# Patient Record
Sex: MALE | Race: WHITE | NOT HISPANIC OR LATINO | Employment: FULL TIME | URBAN - METROPOLITAN AREA
[De-identification: names, ages, dates, MRNs, and addresses within clinical notes are randomized per-mention and may not be internally consistent; named-entity substitution may affect disease eponyms.]

---

## 2017-02-06 ENCOUNTER — OFFICE VISIT - HEALTHEAST (OUTPATIENT)
Dept: FAMILY MEDICINE | Facility: CLINIC | Age: 52
End: 2017-02-06

## 2017-02-06 DIAGNOSIS — N52.9 MALE ERECTILE DISORDER: ICD-10-CM

## 2017-02-06 DIAGNOSIS — I10 HYPERTENSION: ICD-10-CM

## 2017-02-06 DIAGNOSIS — B00.9 HERPES SIMPLEX: ICD-10-CM

## 2017-02-06 DIAGNOSIS — E78.5 HYPERLIPIDEMIA: ICD-10-CM

## 2017-02-06 DIAGNOSIS — Z00.00 PHYSICAL EXAM: ICD-10-CM

## 2017-02-06 LAB
CHOLEST SERPL-MCNC: 168 MG/DL
FASTING STATUS PATIENT QL REPORTED: YES
HDLC SERPL-MCNC: 55 MG/DL
LDLC SERPL CALC-MCNC: 97 MG/DL
PSA SERPL-MCNC: 0.7 NG/ML (ref 0–3.5)
TRIGL SERPL-MCNC: 82 MG/DL

## 2017-02-06 ASSESSMENT — MIFFLIN-ST. JEOR: SCORE: 1806.38

## 2017-02-07 ENCOUNTER — COMMUNICATION - HEALTHEAST (OUTPATIENT)
Dept: FAMILY MEDICINE | Facility: CLINIC | Age: 52
End: 2017-02-07

## 2017-02-08 ENCOUNTER — COMMUNICATION - HEALTHEAST (OUTPATIENT)
Dept: FAMILY MEDICINE | Facility: CLINIC | Age: 52
End: 2017-02-08

## 2017-02-10 ENCOUNTER — COMMUNICATION - HEALTHEAST (OUTPATIENT)
Dept: FAMILY MEDICINE | Facility: CLINIC | Age: 52
End: 2017-02-10

## 2017-02-10 DIAGNOSIS — N52.8 OTHER MALE ERECTILE DYSFUNCTION: ICD-10-CM

## 2017-02-14 ENCOUNTER — AMBULATORY - HEALTHEAST (OUTPATIENT)
Dept: FAMILY MEDICINE | Facility: CLINIC | Age: 52
End: 2017-02-14

## 2017-02-21 ENCOUNTER — COMMUNICATION - HEALTHEAST (OUTPATIENT)
Dept: ADMINISTRATIVE | Facility: CLINIC | Age: 52
End: 2017-02-21

## 2017-03-21 ENCOUNTER — COMMUNICATION - HEALTHEAST (OUTPATIENT)
Dept: FAMILY MEDICINE | Facility: CLINIC | Age: 52
End: 2017-03-21

## 2017-03-21 DIAGNOSIS — I10 HYPERTENSION: ICD-10-CM

## 2017-03-21 DIAGNOSIS — E78.5 HYPERLIPEMIA: ICD-10-CM

## 2017-03-27 ENCOUNTER — COMMUNICATION - HEALTHEAST (OUTPATIENT)
Dept: FAMILY MEDICINE | Facility: CLINIC | Age: 52
End: 2017-03-27

## 2017-03-27 DIAGNOSIS — B00.9 HERPES SIMPLEX: ICD-10-CM

## 2018-01-30 ENCOUNTER — RECORDS - HEALTHEAST (OUTPATIENT)
Dept: ADMINISTRATIVE | Facility: OTHER | Age: 53
End: 2018-01-30

## 2018-03-30 ENCOUNTER — AMBULATORY - HEALTHEAST (OUTPATIENT)
Dept: INTERNAL MEDICINE | Facility: CLINIC | Age: 53
End: 2018-03-30

## 2018-03-30 ENCOUNTER — COMMUNICATION - HEALTHEAST (OUTPATIENT)
Dept: FAMILY MEDICINE | Facility: CLINIC | Age: 53
End: 2018-03-30

## 2018-03-30 DIAGNOSIS — E78.5 HYPERLIPIDEMIA: ICD-10-CM

## 2018-03-30 DIAGNOSIS — B00.9 HERPES SIMPLEX: ICD-10-CM

## 2018-04-02 ENCOUNTER — COMMUNICATION - HEALTHEAST (OUTPATIENT)
Dept: FAMILY MEDICINE | Facility: CLINIC | Age: 53
End: 2018-04-02

## 2018-04-02 DIAGNOSIS — I10 HYPERTENSION: ICD-10-CM

## 2018-04-02 DIAGNOSIS — E78.5 HYPERLIPEMIA: ICD-10-CM

## 2018-04-09 ENCOUNTER — AMBULATORY - HEALTHEAST (OUTPATIENT)
Dept: LAB | Facility: CLINIC | Age: 53
End: 2018-04-09

## 2018-04-09 DIAGNOSIS — E78.5 HYPERLIPIDEMIA: ICD-10-CM

## 2018-04-09 LAB
ALBUMIN SERPL-MCNC: 4 G/DL (ref 3.5–5)
ALP SERPL-CCNC: 50 U/L (ref 45–120)
ALT SERPL W P-5'-P-CCNC: 53 U/L (ref 0–45)
ANION GAP SERPL CALCULATED.3IONS-SCNC: 10 MMOL/L (ref 5–18)
AST SERPL W P-5'-P-CCNC: 37 U/L (ref 0–40)
BILIRUB SERPL-MCNC: 0.6 MG/DL (ref 0–1)
BUN SERPL-MCNC: 14 MG/DL (ref 8–22)
CALCIUM SERPL-MCNC: 9.7 MG/DL (ref 8.5–10.5)
CHLORIDE BLD-SCNC: 102 MMOL/L (ref 98–107)
CHOLEST SERPL-MCNC: 189 MG/DL
CO2 SERPL-SCNC: 29 MMOL/L (ref 22–31)
CREAT SERPL-MCNC: 0.79 MG/DL (ref 0.7–1.3)
FASTING STATUS PATIENT QL REPORTED: YES
GFR SERPL CREATININE-BSD FRML MDRD: >60 ML/MIN/1.73M2
GLUCOSE BLD-MCNC: 92 MG/DL (ref 70–125)
HDLC SERPL-MCNC: 61 MG/DL
LDLC SERPL CALC-MCNC: 105 MG/DL
POTASSIUM BLD-SCNC: 4.4 MMOL/L (ref 3.5–5)
PROT SERPL-MCNC: 7.3 G/DL (ref 6–8)
SODIUM SERPL-SCNC: 141 MMOL/L (ref 136–145)
TRIGL SERPL-MCNC: 114 MG/DL

## 2018-04-16 ENCOUNTER — COMMUNICATION - HEALTHEAST (OUTPATIENT)
Dept: FAMILY MEDICINE | Facility: CLINIC | Age: 53
End: 2018-04-16

## 2018-04-16 ENCOUNTER — OFFICE VISIT - HEALTHEAST (OUTPATIENT)
Dept: FAMILY MEDICINE | Facility: CLINIC | Age: 53
End: 2018-04-16

## 2018-04-16 DIAGNOSIS — B00.9 HERPES SIMPLEX: ICD-10-CM

## 2018-04-16 DIAGNOSIS — Z00.00 PHYSICAL EXAM: ICD-10-CM

## 2018-04-16 DIAGNOSIS — R13.10 DYSPHAGIA: ICD-10-CM

## 2018-04-16 DIAGNOSIS — I10 HYPERTENSION: ICD-10-CM

## 2018-04-16 DIAGNOSIS — E78.5 HYPERLIPIDEMIA: ICD-10-CM

## 2018-04-16 ASSESSMENT — MIFFLIN-ST. JEOR: SCORE: 1850.04

## 2018-05-04 ENCOUNTER — RECORDS - HEALTHEAST (OUTPATIENT)
Dept: ADMINISTRATIVE | Facility: OTHER | Age: 53
End: 2018-05-04

## 2018-05-07 ENCOUNTER — RECORDS - HEALTHEAST (OUTPATIENT)
Dept: ADMINISTRATIVE | Facility: OTHER | Age: 53
End: 2018-05-07

## 2018-07-14 ENCOUNTER — COMMUNICATION - HEALTHEAST (OUTPATIENT)
Dept: FAMILY MEDICINE | Facility: CLINIC | Age: 53
End: 2018-07-14

## 2018-07-14 DIAGNOSIS — I10 HYPERTENSION: ICD-10-CM

## 2018-07-14 DIAGNOSIS — E78.5 HYPERLIPEMIA: ICD-10-CM

## 2018-09-06 ENCOUNTER — COMMUNICATION - HEALTHEAST (OUTPATIENT)
Dept: FAMILY MEDICINE | Facility: CLINIC | Age: 53
End: 2018-09-06

## 2018-09-06 DIAGNOSIS — B00.9 HERPES SIMPLEX: ICD-10-CM

## 2019-04-03 ENCOUNTER — COMMUNICATION - HEALTHEAST (OUTPATIENT)
Dept: FAMILY MEDICINE | Facility: CLINIC | Age: 54
End: 2019-04-03

## 2019-04-03 DIAGNOSIS — E78.5 HYPERLIPEMIA: ICD-10-CM

## 2019-04-03 DIAGNOSIS — I10 HYPERTENSION: ICD-10-CM

## 2019-04-07 ENCOUNTER — COMMUNICATION - HEALTHEAST (OUTPATIENT)
Dept: FAMILY MEDICINE | Facility: CLINIC | Age: 54
End: 2019-04-07

## 2019-04-07 DIAGNOSIS — B00.9 HERPES SIMPLEX: ICD-10-CM

## 2019-05-07 ENCOUNTER — OFFICE VISIT - HEALTHEAST (OUTPATIENT)
Dept: FAMILY MEDICINE | Facility: CLINIC | Age: 54
End: 2019-05-07

## 2019-05-07 DIAGNOSIS — I10 HYPERTENSION, UNSPECIFIED TYPE: ICD-10-CM

## 2019-05-07 DIAGNOSIS — Z00.00 PHYSICAL EXAM: ICD-10-CM

## 2019-05-07 DIAGNOSIS — K22.2 ESOPHAGEAL STRICTURE: ICD-10-CM

## 2019-05-07 DIAGNOSIS — N52.9 MALE ERECTILE DISORDER: ICD-10-CM

## 2019-05-07 DIAGNOSIS — B00.9 HERPES SIMPLEX: ICD-10-CM

## 2019-05-07 DIAGNOSIS — E78.2 MIXED HYPERLIPIDEMIA: ICD-10-CM

## 2019-05-07 DIAGNOSIS — Z12.11 SCREEN FOR COLON CANCER: ICD-10-CM

## 2019-05-07 LAB
ALBUMIN SERPL-MCNC: 4.4 G/DL (ref 3.5–5)
ALP SERPL-CCNC: 49 U/L (ref 45–120)
ALT SERPL W P-5'-P-CCNC: 34 U/L (ref 0–45)
ANION GAP SERPL CALCULATED.3IONS-SCNC: 8 MMOL/L (ref 5–18)
AST SERPL W P-5'-P-CCNC: 31 U/L (ref 0–40)
BILIRUB SERPL-MCNC: 0.5 MG/DL (ref 0–1)
BUN SERPL-MCNC: 12 MG/DL (ref 8–22)
CALCIUM SERPL-MCNC: 10.2 MG/DL (ref 8.5–10.5)
CHLORIDE BLD-SCNC: 106 MMOL/L (ref 98–107)
CHOLEST SERPL-MCNC: 179 MG/DL
CO2 SERPL-SCNC: 30 MMOL/L (ref 22–31)
CREAT SERPL-MCNC: 0.88 MG/DL (ref 0.7–1.3)
FASTING STATUS PATIENT QL REPORTED: YES
GFR SERPL CREATININE-BSD FRML MDRD: >60 ML/MIN/1.73M2
GLUCOSE BLD-MCNC: 96 MG/DL (ref 70–125)
HDLC SERPL-MCNC: 65 MG/DL
LDLC SERPL CALC-MCNC: 95 MG/DL
POTASSIUM BLD-SCNC: 4.6 MMOL/L (ref 3.5–5)
PROT SERPL-MCNC: 7.3 G/DL (ref 6–8)
SODIUM SERPL-SCNC: 144 MMOL/L (ref 136–145)
TRIGL SERPL-MCNC: 93 MG/DL

## 2019-05-07 ASSESSMENT — MIFFLIN-ST. JEOR: SCORE: 1804.11

## 2019-05-08 ENCOUNTER — COMMUNICATION - HEALTHEAST (OUTPATIENT)
Dept: FAMILY MEDICINE | Facility: CLINIC | Age: 54
End: 2019-05-08

## 2019-05-17 ENCOUNTER — HOSPITAL ENCOUNTER (OUTPATIENT)
Dept: CT IMAGING | Facility: CLINIC | Age: 54
Discharge: HOME | End: 2019-05-17
Attending: FAMILY MEDICINE

## 2019-05-17 DIAGNOSIS — Z00.00 PHYSICAL EXAM: ICD-10-CM

## 2019-05-17 DIAGNOSIS — E78.2 MIXED HYPERLIPIDEMIA: ICD-10-CM

## 2019-05-17 DIAGNOSIS — I10 HYPERTENSION, UNSPECIFIED TYPE: ICD-10-CM

## 2019-05-17 LAB
CV CALCIUM SCORE AGATSTON LM: 0
CV CALCIUM SCORING AGATSON LAD: 0
CV CALCIUM SCORING AGATSTON CX: 0
CV CALCIUM SCORING AGATSTON RCA: 0
CV CALCIUM SCORING AGATSTON TOTAL: 0

## 2019-05-21 ENCOUNTER — AMBULATORY - HEALTHEAST (OUTPATIENT)
Dept: FAMILY MEDICINE | Facility: CLINIC | Age: 54
End: 2019-05-21

## 2019-05-21 ENCOUNTER — COMMUNICATION - HEALTHEAST (OUTPATIENT)
Dept: FAMILY MEDICINE | Facility: CLINIC | Age: 54
End: 2019-05-21

## 2019-05-28 ENCOUNTER — COMMUNICATION - HEALTHEAST (OUTPATIENT)
Dept: FAMILY MEDICINE | Facility: CLINIC | Age: 54
End: 2019-05-28

## 2019-05-28 ENCOUNTER — AMBULATORY - HEALTHEAST (OUTPATIENT)
Dept: FAMILY MEDICINE | Facility: CLINIC | Age: 54
End: 2019-05-28

## 2019-05-28 DIAGNOSIS — B00.9 HERPES SIMPLEX: ICD-10-CM

## 2019-05-28 DIAGNOSIS — I10 HYPERTENSION, UNSPECIFIED TYPE: ICD-10-CM

## 2019-06-30 ENCOUNTER — COMMUNICATION - HEALTHEAST (OUTPATIENT)
Dept: FAMILY MEDICINE | Facility: CLINIC | Age: 54
End: 2019-06-30

## 2019-06-30 DIAGNOSIS — E78.5 HYPERLIPEMIA: ICD-10-CM

## 2019-06-30 DIAGNOSIS — I10 HYPERTENSION: ICD-10-CM

## 2019-08-25 ENCOUNTER — COMMUNICATION - HEALTHEAST (OUTPATIENT)
Dept: FAMILY MEDICINE | Facility: CLINIC | Age: 54
End: 2019-08-25

## 2019-08-25 DIAGNOSIS — I10 HYPERTENSION, UNSPECIFIED TYPE: ICD-10-CM

## 2019-09-24 ENCOUNTER — OFFICE VISIT - HEALTHEAST (OUTPATIENT)
Dept: FAMILY MEDICINE | Facility: CLINIC | Age: 54
End: 2019-09-24

## 2019-09-24 DIAGNOSIS — S99.929A INJURY OF NAIL BED OF TOE: ICD-10-CM

## 2019-09-26 ENCOUNTER — OFFICE VISIT - HEALTHEAST (OUTPATIENT)
Dept: PODIATRY | Facility: CLINIC | Age: 54
End: 2019-09-26

## 2019-09-26 DIAGNOSIS — L60.1 ONYCHOLYSIS: ICD-10-CM

## 2019-09-26 DIAGNOSIS — B35.1 ONYCHOMYCOSIS: ICD-10-CM

## 2019-09-26 ASSESSMENT — MIFFLIN-ST. JEOR: SCORE: 1829.06

## 2019-10-03 ENCOUNTER — OFFICE VISIT - HEALTHEAST (OUTPATIENT)
Dept: PODIATRY | Facility: CLINIC | Age: 54
End: 2019-10-03

## 2019-10-03 DIAGNOSIS — B35.1 ONYCHOMYCOSIS: ICD-10-CM

## 2019-10-03 ASSESSMENT — MIFFLIN-ST. JEOR: SCORE: 1829.06

## 2019-11-19 ENCOUNTER — COMMUNICATION - HEALTHEAST (OUTPATIENT)
Dept: FAMILY MEDICINE | Facility: CLINIC | Age: 54
End: 2019-11-19

## 2019-11-19 DIAGNOSIS — I10 HYPERTENSION, UNSPECIFIED TYPE: ICD-10-CM

## 2019-12-10 ENCOUNTER — OFFICE VISIT - HEALTHEAST (OUTPATIENT)
Dept: PODIATRY | Facility: CLINIC | Age: 54
End: 2019-12-10

## 2019-12-10 DIAGNOSIS — M21.621 TAILOR'S BUNION OF BOTH FEET: ICD-10-CM

## 2019-12-10 DIAGNOSIS — B35.1 ONYCHOMYCOSIS: ICD-10-CM

## 2019-12-10 DIAGNOSIS — M21.622 TAILOR'S BUNION OF BOTH FEET: ICD-10-CM

## 2020-03-27 ENCOUNTER — COMMUNICATION - HEALTHEAST (OUTPATIENT)
Dept: INTERNAL MEDICINE | Facility: CLINIC | Age: 55
End: 2020-03-27

## 2020-03-27 DIAGNOSIS — I10 HYPERTENSION, UNSPECIFIED TYPE: ICD-10-CM

## 2020-06-22 ENCOUNTER — COMMUNICATION - HEALTHEAST (OUTPATIENT)
Dept: FAMILY MEDICINE | Facility: CLINIC | Age: 55
End: 2020-06-22

## 2020-06-22 DIAGNOSIS — K22.2 ESOPHAGEAL STRICTURE: ICD-10-CM

## 2020-06-23 ENCOUNTER — COMMUNICATION - HEALTHEAST (OUTPATIENT)
Dept: INTERNAL MEDICINE | Facility: CLINIC | Age: 55
End: 2020-06-23

## 2020-06-23 DIAGNOSIS — I10 HYPERTENSION, UNSPECIFIED TYPE: ICD-10-CM

## 2020-07-14 ENCOUNTER — COMMUNICATION - HEALTHEAST (OUTPATIENT)
Dept: PODIATRY | Facility: CLINIC | Age: 55
End: 2020-07-14

## 2020-07-20 ENCOUNTER — COMMUNICATION - HEALTHEAST (OUTPATIENT)
Dept: FAMILY MEDICINE | Facility: CLINIC | Age: 55
End: 2020-07-20

## 2020-07-21 ENCOUNTER — COMMUNICATION - HEALTHEAST (OUTPATIENT)
Dept: INTERNAL MEDICINE | Facility: CLINIC | Age: 55
End: 2020-07-21

## 2020-07-21 DIAGNOSIS — I10 HYPERTENSION, UNSPECIFIED TYPE: ICD-10-CM

## 2020-08-03 ENCOUNTER — COMMUNICATION - HEALTHEAST (OUTPATIENT)
Dept: INTERNAL MEDICINE | Facility: CLINIC | Age: 55
End: 2020-08-03

## 2020-08-03 DIAGNOSIS — I10 HYPERTENSION, UNSPECIFIED TYPE: ICD-10-CM

## 2020-08-24 ENCOUNTER — OFFICE VISIT - HEALTHEAST (OUTPATIENT)
Dept: FAMILY MEDICINE | Facility: CLINIC | Age: 55
End: 2020-08-24

## 2020-08-24 DIAGNOSIS — K22.2 ESOPHAGEAL STRICTURE: ICD-10-CM

## 2020-08-24 DIAGNOSIS — I10 HYPERTENSION, UNSPECIFIED TYPE: ICD-10-CM

## 2020-08-24 DIAGNOSIS — L84 CALLUS OF FOOT: ICD-10-CM

## 2020-08-24 DIAGNOSIS — B00.9 HERPES SIMPLEX: ICD-10-CM

## 2020-08-24 DIAGNOSIS — N52.9 MALE ERECTILE DISORDER: ICD-10-CM

## 2020-08-24 DIAGNOSIS — E78.2 MIXED HYPERLIPIDEMIA: ICD-10-CM

## 2020-08-24 DIAGNOSIS — Z00.00 PHYSICAL EXAM: ICD-10-CM

## 2020-08-24 LAB
ALBUMIN SERPL-MCNC: 4.4 G/DL (ref 3.5–5)
ALP SERPL-CCNC: 53 U/L (ref 45–120)
ALT SERPL W P-5'-P-CCNC: 23 U/L (ref 0–45)
ANION GAP SERPL CALCULATED.3IONS-SCNC: 8 MMOL/L (ref 5–18)
AST SERPL W P-5'-P-CCNC: 26 U/L (ref 0–40)
BILIRUB SERPL-MCNC: 0.5 MG/DL (ref 0–1)
BUN SERPL-MCNC: 9 MG/DL (ref 8–22)
CALCIUM SERPL-MCNC: 10.1 MG/DL (ref 8.5–10.5)
CHLORIDE BLD-SCNC: 104 MMOL/L (ref 98–107)
CHOLEST SERPL-MCNC: 215 MG/DL
CO2 SERPL-SCNC: 30 MMOL/L (ref 22–31)
CREAT SERPL-MCNC: 0.88 MG/DL (ref 0.7–1.3)
FASTING STATUS PATIENT QL REPORTED: NO
GFR SERPL CREATININE-BSD FRML MDRD: >60 ML/MIN/1.73M2
GLUCOSE BLD-MCNC: 87 MG/DL (ref 70–125)
HDLC SERPL-MCNC: 58 MG/DL
LDLC SERPL CALC-MCNC: 127 MG/DL
POTASSIUM BLD-SCNC: 5 MMOL/L (ref 3.5–5)
PROT SERPL-MCNC: 7.5 G/DL (ref 6–8)
SODIUM SERPL-SCNC: 142 MMOL/L (ref 136–145)
TRIGL SERPL-MCNC: 152 MG/DL

## 2020-08-24 RX ORDER — LISINOPRIL 10 MG/1
10 TABLET ORAL DAILY
Qty: 90 TABLET | Refills: 3 | Status: SHIPPED | OUTPATIENT
Start: 2020-08-24 | End: 2021-08-23

## 2020-08-24 RX ORDER — PANTOPRAZOLE SODIUM 40 MG/1
40 TABLET, DELAYED RELEASE ORAL DAILY
Qty: 90 TABLET | Refills: 3 | Status: SHIPPED | OUTPATIENT
Start: 2020-08-24 | End: 2021-09-28

## 2020-08-24 RX ORDER — SILDENAFIL CITRATE 20 MG/1
20 TABLET ORAL 3 TIMES DAILY
Qty: 90 TABLET | Refills: 1 | Status: SHIPPED | OUTPATIENT
Start: 2020-08-24 | End: 2024-09-09

## 2020-08-24 RX ORDER — VALACYCLOVIR HYDROCHLORIDE 500 MG/1
TABLET, FILM COATED ORAL
Qty: 30 TABLET | Refills: 1 | Status: SHIPPED | OUTPATIENT
Start: 2020-08-24 | End: 2021-09-28

## 2020-08-24 ASSESSMENT — MIFFLIN-ST. JEOR: SCORE: 1790.17

## 2020-08-25 ENCOUNTER — COMMUNICATION - HEALTHEAST (OUTPATIENT)
Dept: FAMILY MEDICINE | Facility: CLINIC | Age: 55
End: 2020-08-25

## 2020-09-07 ENCOUNTER — COMMUNICATION - HEALTHEAST (OUTPATIENT)
Dept: FAMILY MEDICINE | Facility: CLINIC | Age: 55
End: 2020-09-07

## 2020-09-07 DIAGNOSIS — B00.9 HERPES SIMPLEX: ICD-10-CM

## 2021-02-04 ENCOUNTER — RECORDS - HEALTHEAST (OUTPATIENT)
Dept: ADMINISTRATIVE | Facility: OTHER | Age: 56
End: 2021-02-04

## 2021-05-26 VITALS — HEART RATE: 70 BPM | TEMPERATURE: 97.5 F | DIASTOLIC BLOOD PRESSURE: 78 MMHG | SYSTOLIC BLOOD PRESSURE: 152 MMHG

## 2021-05-27 NOTE — TELEPHONE ENCOUNTER
Due to be seen and labs    Rx renewed per Medication Renewal Policy. Medication was last renewed on 9/6/18.    Cait Tapia, [facility] Triage/Med Refill 4/8/2019     Requested Prescriptions   Pending Prescriptions Disp Refills     valACYclovir (VALTREX) 500 MG tablet [Pharmacy Med Name: VALACYCLOVIR  MG TABLET] 30 tablet 1     Sig: TAKE ONE TWICE DAILY AS NEEDED FOR OUTBREAKS       Antivirals Refill Protocol Passed - 4/7/2019  9:55 AM        Passed - Renal function done in last year     Creatinine   Date Value Ref Range Status   04/09/2018 0.79 0.70 - 1.30 mg/dL Final             Passed - Visit with PCP or prescribing provider visit in past 12 months or next 3 months     Last office visit with prescriber/PCP: 2/6/2017 Davy Houston MD OR same dept: Visit date not found OR same specialty: 2/6/2017 Davy Houston MD  Last physical: 4/16/2018 Last MTM visit: Visit date not found   Next visit within 3 mo: Visit date not found  Next physical within 3 mo: Visit date not found  Prescriber OR PCP: Davy Houston MD  Last diagnosis associated with med order: 1. Herpes simplex  - valACYclovir (VALTREX) 500 MG tablet [Pharmacy Med Name: VALACYCLOVIR  MG TABLET]; TAKE ONE TWICE DAILY AS NEEDED FOR OUTBREAKS  Dispense: 30 tablet; Refill: 1    If protocol passes may refill for 12 months if within 3 months of last provider visit (or a total of 15 months).

## 2021-05-27 NOTE — TELEPHONE ENCOUNTER
Due to be seen and labs    Rx renewed per Medication Renewal Policy. Medication was last renewed on 7/15/18.    Cait Tapia, [facility] Triage/Med Refill 4/4/2019     Requested Prescriptions   Pending Prescriptions Disp Refills     simvastatin (ZOCOR) 10 MG tablet [Pharmacy Med Name: SIMVASTATIN 10 MG TABLET] 90 tablet 2     Sig: TAKE 1 TABLET BY MOUTH EVERY DAY    Statins Refill Protocol (Hmg CoA Reductase Inhibitors) Passed - 4/3/2019  9:04 AM       Passed - PCP or prescribing provider visit in past 12 months     Last office visit with prescriber/PCP: 2/6/2017 Davy Houston MD OR same dept: Visit date not found OR same specialty: 2/6/2017 Davy Houston MD  Last physical: 4/16/2018 Last MTM visit: Visit date not found   Next visit within 3 mo: Visit date not found  Next physical within 3 mo: Visit date not found  Prescriber OR PCP: Davy Houston MD  Last diagnosis associated with med order: 1. Hyperlipemia  - simvastatin (ZOCOR) 10 MG tablet [Pharmacy Med Name: SIMVASTATIN 10 MG TABLET]; TAKE 1 TABLET BY MOUTH EVERY DAY  Dispense: 90 tablet; Refill: 2    2. Hypertension  - lisinopril (PRINIVIL,ZESTRIL) 5 MG tablet [Pharmacy Med Name: LISINOPRIL 5 MG TABLET]; TAKE 1 TABLET BY MOUTH EVERY DAY  Dispense: 90 tablet; Refill: 2    If protocol passes may refill for 12 months if within 3 months of last provider visit (or a total of 15 months).             lisinopril (PRINIVIL,ZESTRIL) 5 MG tablet [Pharmacy Med Name: LISINOPRIL 5 MG TABLET] 90 tablet 2     Sig: TAKE 1 TABLET BY MOUTH EVERY DAY    Ace Inhibitors Refill Protocol Passed - 4/3/2019  9:04 AM       Passed - PCP or prescribing provider visit in past 12 months      Last office visit with prescriber/PCP: 2/6/2017 Davy Houston MD OR mikayla dept: Visit date not found OR same specialty: 2/6/2017 Davy Houston MD  Last physical: 4/16/2018 Last MTM visit: Visit date not found   Next visit within 3 mo: Visit date not found  Next physical within 3  mo: Visit date not found  Prescriber OR PCP: Davy Houston MD  Last diagnosis associated with med order: 1. Hyperlipemia  - simvastatin (ZOCOR) 10 MG tablet [Pharmacy Med Name: SIMVASTATIN 10 MG TABLET]; TAKE 1 TABLET BY MOUTH EVERY DAY  Dispense: 90 tablet; Refill: 2    2. Hypertension  - lisinopril (PRINIVIL,ZESTRIL) 5 MG tablet [Pharmacy Med Name: LISINOPRIL 5 MG TABLET]; TAKE 1 TABLET BY MOUTH EVERY DAY  Dispense: 90 tablet; Refill: 2    If protocol passes may refill for 12 months if within 3 months of last provider visit (or a total of 15 months).            Passed - Serum Potassium in past 12 months    Lab Results   Component Value Date    Potassium 4.4 04/09/2018            Passed - Blood pressure filed in past 12 months    BP Readings from Last 1 Encounters:   04/16/18 104/74            Passed - Serum Creatinine in past 12 months    Creatinine   Date Value Ref Range Status   04/09/2018 0.79 0.70 - 1.30 mg/dL Final

## 2021-05-28 NOTE — PATIENT INSTRUCTIONS - HE
We will comment on laboratory studies and also contact you with the results of the coronary calcium score.  Check blood pressure on occasion, goal is less than 140/90 if running higher than not let me know we may need to his blood pressure medication.

## 2021-05-30 VITALS — HEIGHT: 72 IN | WEIGHT: 206 LBS | BODY MASS INDEX: 27.9 KG/M2

## 2021-05-30 NOTE — TELEPHONE ENCOUNTER
Jhoan Renteria   5/21/2019   Orders Only   MRN:  [id number]   Description: 53 year old male Provider: Davy Houston MD Department: [facility]   Progress Notes     No notes of this type exist for this encounter.   Discontinued Medications      Reason for Discontinue   aspirin 81 MG EC tablet Therapy completed   simvastatin (ZOCOR) 10 MG tablet Therapy completed     Discontinued Medications      Reason for Discontinue   lisinopril (PRINIVIL,ZESTRIL) 5 MG tablet Dose adjustment     Cait Tapia RN [facility] Triage/Medication Refill

## 2021-05-31 NOTE — TELEPHONE ENCOUNTER
Refill Approved    Rx renewed per Medication Renewal Policy. Medication was last renewed on 5/28/19.  OV 5/7/19.  Lis Mahoney, [facility] Triage/Med Refill 8/25/2019     Requested Prescriptions   Pending Prescriptions Disp Refills     lisinopril (PRINIVIL,ZESTRIL) 10 MG tablet [Pharmacy Med Name: LISINOPRIL 10 MG TABLET] 90 tablet 0     Sig: TAKE 1 TABLET BY MOUTH EVERY DAY       Ace Inhibitors Refill Protocol Passed - 8/25/2019  8:31 AM        Passed - PCP or prescribing provider visit in past 12 months       Last office visit with prescriber/PCP: 2/6/2017 Davy Houston MD OR same dept: Visit date not found OR same specialty: 2/6/2017 Davy Houston MD  Last physical: 5/7/2019 Last MTM visit: Visit date not found   Next visit within 3 mo: Visit date not found  Next physical within 3 mo: Visit date not found  Prescriber OR PCP: Davy Houston MD  Last diagnosis associated with med order: 1. Hypertension, unspecified type  - lisinopril (PRINIVIL,ZESTRIL) 10 MG tablet [Pharmacy Med Name: LISINOPRIL 10 MG TABLET]; TAKE 1 TABLET BY MOUTH EVERY DAY  Dispense: 90 tablet; Refill: 0    If protocol passes may refill for 12 months if within 3 months of last provider visit (or a total of 15 months).             Passed - Serum Potassium in past 12 months     Lab Results   Component Value Date    Potassium 4.6 05/07/2019             Passed - Blood pressure filed in past 12 months     BP Readings from Last 1 Encounters:   05/07/19 167/87             Passed - Serum Creatinine in past 12 months     Creatinine   Date Value Ref Range Status   05/07/2019 0.88 0.70 - 1.30 mg/dL Final

## 2021-06-01 VITALS — BODY MASS INDEX: 28.23 KG/M2 | HEIGHT: 73 IN | WEIGHT: 213 LBS

## 2021-06-01 NOTE — PATIENT INSTRUCTIONS - HE
Lamisil cream: apply 2x per day. Begin use in 2-3 weeks.    Penlac: Begin applying after nail is seen.     Post Nail Excision Instructions      Keep bandages clean, dry, and intact for the rest of the day of surgery.     The day after surgery, remove all bandages    Soak foot in warm water with Epsom Salt for 10 minutes    Dry feet thoroughly     Apply a Band-Aid to the affected area    Continue this process daily for the next two weeks following the procedure    General bathing does not replace daily foot soaks    Do not anticipate severe pain. But if you do experience some discomfort, you can take Ibuprofen (Advil)    You can expect some drainage and weeping from the area. This may last anywhere from several days to several weeks. This is NORMAL.    If you experience any increase in pain, any swelling, or odor call our office immediately. As this may be a sign of infection.    If you have any questions in the meantime, please do not hesitate to call Podiatry at [phone]

## 2021-06-01 NOTE — PROGRESS NOTES
FOOT AND ANKLE SURGERY/PODIATRY CONSULT NOTE         ASSESSMENT:   Onycholysis left hallux  Onychomycosis      TREATMENT:  Onycholysis: There is apparent subungal fluid collection along the left hallux which seems to have migrated along the cuticle. Based on this presentation I recommend a nail avulsion today to relieve fluid build-up and inspect the nail bed. Patient consents. Injected 6 cc 1% lidocaine plain left hallux. Total nail plate removed today, serosanguinous drainage. No purulence. Nail bed is intact. Bacitracin with compressive dressing applied. Post-op instructions dispensed.     Onychomycosis: Discussed topical and oral anti-fungal medication today. Rx Penlac. He will begin with topical lamisil cream two times a day in 2-3 weeks.    He will follow-up with me in one week.     Colton Alvarado, BRITANY  [facility] Foot & Ankle Surgery/Podiatry      HPI: I was asked to see Jhoan Renteria today Antonio Peters for an injury to the left hallux. 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 Denies direct trauma to the digit. He is experiencing pain with walking and direct pressure. No active drainage. He does walk at work, but not excessively. PMH is non-contributory. He also would like to discuss treatment options for fungal nails.    No past medical history on file.    Past Surgical History:   Procedure Laterality Date     FOOT SURGERY Right     Bone spur removal.     SKIN LESION EXCISION      not cancer       No Known Allergies      Current Outpatient Medications:      lisinopril (PRINIVIL,ZESTRIL) 10 MG tablet, Take 1 tablet (10 mg total) by mouth daily., Disp: 90 tablet, Rfl: 0     pantoprazole (PROTONIX) 40 MG tablet, Take 1 tablet (40 mg total) by mouth daily., Disp: 90 tablet, Rfl: 3     sildenafil, antihypertensive, (REVATIO) 20 mg tablet, Take 1 tablet (20 mg total) by mouth 3 (three) times a day., Disp: 90 tablet, Rfl:  1     valACYclovir (VALTREX) 500 MG tablet, TAKE ONE TWICE DAILY AS NEEDED FOR OUTBREAKS, Disp: 30 tablet, Rfl: 1    Family History   Problem Relation Age of Onset     Hypertension Mother      Ovarian cancer Mother      Atrial fibrillation Mother         Ablation     Hyperlipidemia Brother      Hypertension Brother      Heart disease Paternal Uncle      Stroke Paternal Uncle      Prostate cancer Paternal Uncle      Cancer Maternal Grandmother      Heart disease Maternal Grandfather      Dementia Paternal Grandmother      Cancer Paternal Grandmother      Parkinsonism Paternal Grandmother      Stroke Paternal Grandfather 52     Heart attack Paternal Grandfather 58     Arthritis Brother      Hypertension Brother      Hyperlipidemia Brother      Hypertension Brother      Hyperlipidemia Brother        Social History     Socioeconomic History     Marital status:      Spouse name: Not on file     Number of children: 4     Years of education: Not on file     Highest education level: Not on file   Occupational History     Occupation: Computer Programming     Comment: [employer]   Social Needs     Financial resource strain: Not on file     Food insecurity:     Worry: Not on file     Inability: Not on file     Transportation needs:     Medical: Not on file     Non-medical: Not on file   Tobacco Use     Smoking status: Never Smoker     Smokeless tobacco: Never Used   Substance and Sexual Activity     Alcohol use: Yes     Alcohol/week: 3.0 standard drinks     Types: 3 Cans of beer per week     Drug use: No     Sexual activity: Yes     Partners: Female   Lifestyle     Physical activity:     Days per week: Not on file     Minutes per session: Not on file     Stress: Not on file   Relationships     Social connections:     Talks on phone: Not on file     Gets together: Not on file     Attends [employer] service: Not on file     Active member of club or organization: Not on file     Attends meetings of clubs or  organizations: Not on file     Relationship status: Not on file     Intimate partner violence:     Fear of current or ex partner: Not on file     Emotionally abused: Not on file     Physically abused: Not on file     Forced sexual activity: Not on file   Other Topics Concern     Not on file   Social History Narrative    Diet- Regular,         Exercise- Walking, weightlifting       Review of Systems - Patient denies fever, chills, rash, wound, stiffness, limping, numbness, weakness, heart burn, blood in stool, chest pain with activity, calf pain when walking, shortness of breath with activity, chronic cough, easy bleeding/bruising, swelling of ankles, excessive thirst, fatigue, depression, anxiety.        OBJECTIVE:  Appearance: alert, well appearing, and in no distress.    Vitals:    09/26/19 1345   BP: 122/88   Resp: 16   Temp: 98.2  F (36.8  C)       BMI= Body mass index is 28.42 kg/m .    General appearance: Patient is alert and fully cooperative with history & exam.  No sign of distress is noted during the visit.     Psychiatric: Affect is pleasant & appropriate.  Patient appears motivated to improve health.     Respiratory: Breathing is regular & unlabored while sitting.     HEENT: Hearing is intact to spoken word.  Speech is clear.  No gross evidence of visual impairment that would impact ambulation.      Vascular: Dorsalis pedis and posterior tibial pulses are palpable. There is pedal hair growth bilateral.  CFT < 3 sec from anterior tibial surface to distal digits bilateral. There is no appreciable edema noted.  Dermatologic: Turgor and texture are within normal limits. Left hallux nail plate is free from nail bed proximally with discoloration consistent with subungal fluid. No erythema. Dystrophic nails bilateral feet.   Neurologic: All epicritic and proprioceptive sensations are grossly intact bilateral.  Musculoskeletal: Pain along dorsal left hallux nail plate. No pain along proximal left hallux or 1st  CARMEN.     Imaging:     No results found.

## 2021-06-02 NOTE — PROGRESS NOTES
Podiatry Progress Note        ASSESSMENT: S/P Nail avulsion       TREATMENT:  -Surgical site on the left hallux is progressing well. I recommend he ween off band aids in 3-4 days. Then begin topical antifungal cream two times a day. Penlac to be applied after nail plate is present.   -The patient is discharged at this time, but encouraged to return as symptoms dictate.       Colton Alvarado DPM  [facility] Foot & Ankle Surgery/Podiatry      HPI: Jhoan Renteria was seen again today s/p nail avulsion on the left hallux. Doing well. Mild pain, otherwise no concerns.     No past medical history on file.    No Known Allergies      Current Outpatient Medications:      ciclopirox (PENLAC) 8 % solution, Apply topically daily. Apply daily to toenals, Disp: 1 Bottle, Rfl: 6     lisinopril (PRINIVIL,ZESTRIL) 10 MG tablet, Take 1 tablet (10 mg total) by mouth daily., Disp: 90 tablet, Rfl: 0     pantoprazole (PROTONIX) 40 MG tablet, Take 1 tablet (40 mg total) by mouth daily., Disp: 90 tablet, Rfl: 3     sildenafil, antihypertensive, (REVATIO) 20 mg tablet, Take 1 tablet (20 mg total) by mouth 3 (three) times a day., Disp: 90 tablet, Rfl: 1     valACYclovir (VALTREX) 500 MG tablet, TAKE ONE TWICE DAILY AS NEEDED FOR OUTBREAKS, Disp: 30 tablet, Rfl: 1    Review of Systems - Negative       OBJECTIVE:  Appearance: alert, well appearing, and in no distress.    Vitals:    10/03/19 1336   BP: 134/78   Temp: 98.7  F (37.1  C)       Left nail bed is intact, granular base, no fluctuance. No erythema. Neurovascular status intact left foot.    Imaging: None

## 2021-06-03 VITALS
HEART RATE: 74 BPM | OXYGEN SATURATION: 97 % | DIASTOLIC BLOOD PRESSURE: 81 MMHG | BODY MASS INDEX: 28.42 KG/M2 | WEIGHT: 211 LBS | TEMPERATURE: 98.4 F | SYSTOLIC BLOOD PRESSURE: 133 MMHG | RESPIRATION RATE: 15 BRPM

## 2021-06-03 VITALS
SYSTOLIC BLOOD PRESSURE: 134 MMHG | TEMPERATURE: 98.7 F | HEIGHT: 72 IN | BODY MASS INDEX: 28.58 KG/M2 | DIASTOLIC BLOOD PRESSURE: 78 MMHG | WEIGHT: 211 LBS

## 2021-06-03 VITALS
WEIGHT: 211 LBS | SYSTOLIC BLOOD PRESSURE: 122 MMHG | BODY MASS INDEX: 28.58 KG/M2 | HEIGHT: 72 IN | TEMPERATURE: 98.2 F | DIASTOLIC BLOOD PRESSURE: 88 MMHG | RESPIRATION RATE: 16 BRPM

## 2021-06-03 VITALS — BODY MASS INDEX: 27.83 KG/M2 | HEIGHT: 72 IN | WEIGHT: 205.5 LBS

## 2021-06-03 NOTE — TELEPHONE ENCOUNTER
Refill Approved    Rx renewed per Medication Renewal Policy. Medication was last renewed on 8/25/19.    Lis Mahoney, [facility] Triage/Med Refill 11/19/2019     Requested Prescriptions   Pending Prescriptions Disp Refills     lisinopril (PRINIVIL,ZESTRIL) 10 MG tablet [Pharmacy Med Name: LISINOPRIL 10 MG TABLET] 90 tablet 0     Sig: TAKE 1 TABLET BY MOUTH EVERY DAY       Ace Inhibitors Refill Protocol Passed - 11/19/2019  1:54 AM        Passed - PCP or prescribing provider visit in past 12 months       Last office visit with prescriber/PCP: 2/6/2017 Davy Houston MD OR same dept: Visit date not found OR same specialty: 2/6/2017 Davy Houston MD  Last physical: 5/7/2019 Last MTM visit: Visit date not found   Next visit within 3 mo: Visit date not found  Next physical within 3 mo: Visit date not found  Prescriber OR PCP: Davy Houston MD  Last diagnosis associated with med order: 1. Hypertension, unspecified type  - lisinopril (PRINIVIL,ZESTRIL) 10 MG tablet [Pharmacy Med Name: LISINOPRIL 10 MG TABLET]; TAKE 1 TABLET BY MOUTH EVERY DAY  Dispense: 90 tablet; Refill: 0    If protocol passes may refill for 12 months if within 3 months of last provider visit (or a total of 15 months).             Passed - Serum Potassium in past 12 months     Lab Results   Component Value Date    Potassium 4.6 05/07/2019             Passed - Blood pressure filed in past 12 months     BP Readings from Last 1 Encounters:   10/03/19 134/78             Passed - Serum Creatinine in past 12 months     Creatinine   Date Value Ref Range Status   05/07/2019 0.88 0.70 - 1.30 mg/dL Final

## 2021-06-04 VITALS
DIASTOLIC BLOOD PRESSURE: 78 MMHG | BODY MASS INDEX: 26.48 KG/M2 | WEIGHT: 199.8 LBS | HEIGHT: 73 IN | SYSTOLIC BLOOD PRESSURE: 118 MMHG | HEART RATE: 76 BPM

## 2021-06-04 NOTE — PROGRESS NOTES
FOOT AND ANKLE SURGERY/PODIATRY Progress Note        ASSESSMENT:   Onychomycosis  Keratoma  Tailor's bunion       TREATMENT:  -There is evidence of proximal nail growth on the left hallux. I recommend he d/c lamisil cream and begin using Penlac daily.     -Trimmed callus tissue plantar 5th MPJ bilateral. Referred to local routine foot care providers for future callus trimming.    -He will monitor for concerns and follow-up with me as needed.     Colton Alvarado DPM  [facility] Podiatry/Foot & Ankle Surgery      HPI: Jhoan Renteria was seen again today requesting callus trimming and to review application of Penlac. He has been applying lamisil cream as directed and now notices nail growth.     History reviewed. No pertinent past medical history.    Past Surgical History:   Procedure Laterality Date     FOOT SURGERY Right     Bone spur removal.     SKIN LESION EXCISION      not cancer       No Known Allergies      Current Outpatient Medications:      ciclopirox (PENLAC) 8 % solution, Apply topically daily. Apply daily to toenals, Disp: 1 Bottle, Rfl: 6     lisinopril (PRINIVIL,ZESTRIL) 10 MG tablet, Take 1 tablet (10 mg total) by mouth daily., Disp: 90 tablet, Rfl: 0     pantoprazole (PROTONIX) 40 MG tablet, Take 1 tablet (40 mg total) by mouth daily., Disp: 90 tablet, Rfl: 3     sildenafil, antihypertensive, (REVATIO) 20 mg tablet, Take 1 tablet (20 mg total) by mouth 3 (three) times a day., Disp: 90 tablet, Rfl: 1     valACYclovir (VALTREX) 500 MG tablet, TAKE ONE TWICE DAILY AS NEEDED FOR OUTBREAKS, Disp: 30 tablet, Rfl: 1    Family History   Problem Relation Age of Onset     Hypertension Mother      Ovarian cancer Mother      Atrial fibrillation Mother         Ablation     Hyperlipidemia Brother      Hypertension Brother      Heart disease Paternal Uncle      Stroke Paternal Uncle      Prostate cancer Paternal Uncle      Cancer Maternal Grandmother      Heart disease Maternal Grandfather      Dementia  Paternal Grandmother      Cancer Paternal Grandmother      Parkinsonism Paternal Grandmother      Stroke Paternal Grandfather 52     Heart attack Paternal Grandfather 58     Arthritis Brother      Hypertension Brother      Hyperlipidemia Brother      Hypertension Brother      Hyperlipidemia Brother        Social History     Socioeconomic History     Marital status:      Spouse name: Not on file     Number of children: 4     Years of education: Not on file     Highest education level: Not on file   Occupational History     Occupation: Computer Programming     Comment: [employer]   Social Needs     Financial resource strain: Not on file     Food insecurity:     Worry: Not on file     Inability: Not on file     Transportation needs:     Medical: Not on file     Non-medical: Not on file   Tobacco Use     Smoking status: Never Smoker     Smokeless tobacco: Never Used   Substance and Sexual Activity     Alcohol use: Yes     Alcohol/week: 3.0 standard drinks     Types: 3 Cans of beer per week     Drug use: No     Sexual activity: Yes     Partners: Female   Lifestyle     Physical activity:     Days per week: Not on file     Minutes per session: Not on file     Stress: Not on file   Relationships     Social connections:     Talks on phone: Not on file     Gets together: Not on file     Attends Tenriism service: Not on file     Active member of club or organization: Not on file     Attends meetings of clubs or organizations: Not on file     Relationship status: Not on file     Intimate partner violence:     Fear of current or ex partner: Not on file     Emotionally abused: Not on file     Physically abused: Not on file     Forced sexual activity: Not on file   Other Topics Concern     Not on file   Social History Narrative    Diet- Regular,         Exercise- Walking, weightlifting       10 point Review of Systems is negative except for left hallux fungal nail which is noted in HPI.       Vitals:    12/10/19  1106   BP: (!) 148/94   Pulse: 70   Temp: 97.5  F (36.4  C)       BMI= There is no height or weight on file to calculate BMI.    OBJECTIVE:  General appearance: Patient is alert and fully cooperative with history & exam.  No sign of distress is noted during the visit.  Vascular: Dorsalis pedis and posterior tibial pulses are palpable. There is pedal hair growth left.  CFT < 3 sec from anterior tibial surface to distal digits left. There is no appreciable edema noted.  Dermatologic: Turgor and texture are within normal limits. Proximal nail plate growth noted left hallux. No erythema. Hyperkeratotic tissue plantar 5th MPJ bilateral.   Neurologic: All epicritic and proprioceptive sensations are grossly intact left.  Musculoskeletal: Mild tailor's bunion bilateral.     Imaging:     No results found.

## 2021-06-04 NOTE — PATIENT INSTRUCTIONS - HE
Podiatry Clinics that offer foot and toenail care  [facility]  [facility]  [phone]    Foot and Ankle Clinics, [facility]  [phone]    [facility]  [place] - Dr. Orellana on Tuesdays  [phone]    [facility]  [employer]  [phone]    [facility]  [facility] [place]  [phone]    [facility]  [phone]    [facility]  [phone]    [facility]  They have several locations and have a team of registered nurses that offer diabetic foot care.  They do not bill to insurance and the average cost per visit is $37.  [facility]  [phone]    [facility]  *Nurse comes to your home for nail care.  Betty Mike RN Foot Specialist  [phone]

## 2021-06-05 ENCOUNTER — HEALTH MAINTENANCE LETTER (OUTPATIENT)
Age: 56
End: 2021-06-05

## 2021-06-08 NOTE — PROGRESS NOTES
ASSESSMENT:  1. Physical exam  Patient overall has good health habits  - PSA (Prostatic-Specific Antigen), Annual Screen    2. Hyperlipidemia  Simvastatin, I don't have cream and values.  - Comprehensive Metabolic Panel  - Lipid Cascade    3. Hypertension  Well-controlled on lisinopril    4. Herpes simplex  Occasional cold sores spell checks as needed    5. Male erectile disorder  Has been on Viagra, this is been less effective for unclear reasons it does not appear that the patient has symptoms clinically consistent with low testosterone      PLAN:  1.  Laboratory studies as above including PSA because of a history of ovarian cancer in his mother and prostate cancer in a paternal uncle  2.  Laboratory studies as above  3.  Trial of Cialis 20 mg  4.  Patient otherwise should be seen in one year    Orders Placed This Encounter   Procedures     Comprehensive Metabolic Panel     Lipid Cascade     Order Specific Question:   Fasting is required?     Answer:   Yes     PSA (Prostatic-Specific Antigen), Annual Screen     There are no discontinued medications.    No Follow-up on file.    CHIEF COMPLAINT:  Chief Complaint   Patient presents with     Annual Exam     pt is fasting, EST CARE NEEDS: flu vacc        SUBJECTIVE:  Jhoan is a 51 y.o. male who presents to the clinic today for an annual exam.    Health Maintenance: He received the flu shot earlier this season. He is up-to-date for his colonoscopy until 10/13/2019. He is interested in information regarding the Shingles vaccine. He is interested in prostate cancer screening.    REVIEW OF SYSTEMS:   He is currently managing his blood pressure with lisinopril. He has tolerated lisinopril well and has taken other blood pressure medications in the past, hydrochlorothiazide, about three years ago. He had tolerated hydrochlorothiazide as well. He has had elevated blood pressure for about the past twenty years. He has long-standing high cholesterol as well, which he is  currently managing with simvastatin, which he has tolerated well. He is taking low-dose aspirin. He takes supplemental Vitamin D. He uses Valtrex as needed for cold sores and oral sores. He has not had a flare up of cold sores for the past two years, which he feels is stress-related. He continues to use 100 mg of Viagra as needed. He has found that the Viagra is not as effective as it has been. He has noticed that it has become more difficult to swallow solid foods. He had asthma when he was younger, for which he did have medication. He does not use an inhaler, but has noticed some difficulty breathing with running. He has noticed some increased urinary urgency in the morning and occasional waking up at night. He is concerned about his testosterone levels. All other systems are negative.    PFSH:  His mother is currently being treated for ovarian cancer. His adopted daughter is experiencing issues with her mental health.  Immunization History   Administered Date(s) Administered     Hep A, historic 09/26/2003, 10/28/2003, 02/13/2008     Hep B, historic 09/26/2003, 10/28/2003, 03/22/2004, 02/13/2008     Influenza [id number], 01/20/2010     Influenza, inj, historic 11/18/2006, 10/05/2009, 10/04/2010, 10/03/2011, 10/15/2012, 10/07/2013, 10/06/2014, 10/09/2015, 10/15/2015, 11/01/2016     Td, historic 06/07/2004     Tdap 02/13/2008     Social History     Social History     Marital status:      Spouse name: N/A     Number of children: 4     Years of education: N/A     Occupational History     Computer Programming      [employer]     Social History Main Topics     Smoking status: Never Smoker     Smokeless tobacco: Never Used     Alcohol use 1.8 oz/week     3 Cans of beer per week     Drug use: No     Sexual activity: Yes     Partners: Female     Other Topics Concern     Not on file     Social History Narrative    Diet- Regular.        Exercise- Walking, weightlifting     History reviewed. No pertinent past  "medical history.  Family History   Problem Relation Age of Onset     Hypertension Mother      Ovarian cancer Mother      Hyperlipidemia Brother      Hypertension Brother      Heart disease Paternal Uncle      Stroke Paternal Uncle      Prostate cancer Paternal Uncle      Cancer Maternal Grandmother      Heart disease Maternal Grandfather      Dementia Paternal Grandmother      Cancer Paternal Grandmother      Parkinsonism Paternal Grandmother      Stroke Paternal Grandfather 52     Heart attack Paternal Grandfather 58     Arthritis Brother        MEDICATIONS:  Current Outpatient Prescriptions   Medication Sig Dispense Refill     aspirin 81 MG EC tablet Take 81 mg by mouth daily.       cholecalciferol, vitamin D3, 1,000 unit tablet Take 1,000 Units by mouth daily.       lisinopril (PRINIVIL,ZESTRIL) 5 MG tablet TAKE 1 TABLET BY MOUTH EVERY DAY 90 tablet 0     multivitamin with minerals (THERA-M) 9 mg iron-400 mcg Tab tablet Take 1 tablet by mouth daily.       sildenafil (VIAGRA) 100 MG tablet Take 1 tablet (100 mg total) by mouth daily as needed for erectile dysfunction. 10 tablet 3     simvastatin (ZOCOR) 10 MG tablet TAKE 1 TABLET BY MOUTH EVERY DAY 90 tablet 0     valACYclovir (VALTREX) 500 MG tablet Take 1 tablet (500 mg total) by mouth as needed. 30 tablet 0     tadalafil (CIALIS) 20 MG tablet Take 1 tablet (20 mg total) by mouth daily as needed for erectile dysfunction. 6 tablet 5     No current facility-administered medications for this visit.        TOBACCO USE:  History   Smoking Status     Never Smoker   Smokeless Tobacco     Never Used       VITALS:  Vitals:    02/06/17 0752   BP: 120/70   Pulse: 74   Weight: 206 lb (93.4 kg)   Height: 6' 0.25\" (1.835 m)     Wt Readings from Last 3 Encounters:   02/06/17 206 lb (93.4 kg)   12/16/15 196 lb (88.9 kg)       PHYSICAL EXAM:  Constitutional:   Reveals an alert male that appears stated age.  Vitals: per nursing notes.  HEENT: Right Ear: External ear normal. "   Left Ear: External ear normal.   Nose: Nose normal.   Mouth/Throat: Oropharynx is clear and moist.   Eyes: Conjunctivae and EOM are normal. Pupils are equal, round, and reactive to light. Right eye exhibits no discharge. Left eye exhibits no discharge.   Neck:  Supple, no carotid bruits or adenopathy.  Back:  No spine or CVA pain.  Thorax:  No bony deformities.  Lungs: Clear to A&P without rales or wheezes.  Respiratory effort normal.  Cardiac:   Regular rate and rhythm, normal S1, S2, no murmur or gallop.  Abdomen:  Soft, active bowel sounds without bruits, mass, or tenderness.  Extremities:   No peripheral edema, pulses in the feet intact.    Skin:  No jaundice, peripheral cyanosis or lesions to suggest malignancy.  Neuro:  Alert and oriented. Cranial nerves, motor, sensory exams are intact.  No gross focal deficits.  Psychiatric:  Memory intact, mood appropriate.    DATA REVIEWED:  Additional History from Old Records Summarized (2): Reviewed 10/13/2014 colonoscopy report. Reviewed 12/16/2015 note from Dr. Breaux regarding health maintenance.  Decision to Obtain Records (1): None  Radiology Tests Summarized or Ordered (1): None  Labs Reviewed or Ordered (1): Ordered labs. Reviewed 8/22/2013 labs. Reviewed 10/7/2015 labs.  Medicine Test Summarized or Ordered (1): None  Independent Review of EKG, X-RAY, or RAPID STREP (2 each): None    The visit lasted a total of 30 minutes face to face with the patient. Over 50% of the time was spent counseling and educating the patient about health maintenance.    I, Figueroa Talbert, am scribing for and in the presence of, Dr. Houston.    I, Dr. Houston, personally performed the services described in this documentation, as scribed by Figueroa Talbert in my presence, and it is both accurate and complete.    Total Data Points: 3

## 2021-06-09 NOTE — TELEPHONE ENCOUNTER
Left message for patient to call primary to get the referral to [facility] foot [facility] ankle

## 2021-06-09 NOTE — TELEPHONE ENCOUNTER
Patient is calling because he needs a referral to [facility] foot [facility] ankle, Dr. Alvarado suggested going there to treat his calluses.  His insurance needs the referral for this to be covered.

## 2021-06-09 NOTE — TELEPHONE ENCOUNTER
RN cannot approve Refill Request    RN can NOT refill this medication PCP messaged that patient is overdue for Labs and Office Visit. Last office visit: 2/6/2017 Davy Houston MD Last Physical: 5/7/2019 Last MTM visit: Visit date not found Last visit same specialty: Visit date not found.  Next visit within 3 mo: Visit date not found  Next physical within 3 mo: Visit date not found      Rosario Stanton, [facility] Triage/Med Refill 6/23/2020    Requested Prescriptions   Pending Prescriptions Disp Refills     lisinopriL (PRINIVIL,ZESTRIL) 10 MG tablet [Pharmacy Med Name: LISINOPRIL 10 MG TABLET] 90 tablet 0     Sig: TAKE 1 TABLET BY MOUTH EVERY DAY       Ace Inhibitors Refill Protocol Failed - 6/23/2020  8:34 AM        Failed - Serum Potassium in past 12 months     No results found for: LN-POTASSIUM          Failed - Serum Creatinine in past 12 months     Creatinine   Date Value Ref Range Status   05/07/2019 0.88 0.70 - 1.30 mg/dL Final             Passed - PCP or prescribing provider visit in past 12 months       Last office visit with prescriber/PCP: 2/6/2017 Davy Houston MD OR same dept: Visit date not found OR same specialty: Visit date not found  Last physical: 5/7/2019 Last MTM visit: Visit date not found   Next visit within 3 mo: Visit date not found  Next physical within 3 mo: Visit date not found  Prescriber OR PCP: Davy Houston MD  Last diagnosis associated with med order: 1. Hypertension, unspecified type  - lisinopriL (PRINIVIL,ZESTRIL) 10 MG tablet [Pharmacy Med Name: LISINOPRIL 10 MG TABLET]; TAKE 1 TABLET BY MOUTH EVERY DAY  Dispense: 90 tablet; Refill: 0    If protocol passes may refill for 12 months if within 3 months of last provider visit (or a total of 15 months).             Passed - Blood pressure filed in past 12 months     BP Readings from Last 1 Encounters:   12/10/19 152/78

## 2021-06-09 NOTE — TELEPHONE ENCOUNTER
RN cannot approve Refill Request    RN can NOT refill this medication Protocol failed and NO refill given. Last office visit: 2/6/2017 Davy Houston MD Last Physical: 5/7/2019 Last MTM visit: Visit date not found Last visit same specialty: Visit date not found.  Next visit within 3 mo: Visit date not found  Next physical within 3 mo: Visit date not found      Cait Tapia, [facility] Triage/Med Refill 7/22/2020    Requested Prescriptions   Pending Prescriptions Disp Refills     lisinopriL (PRINIVIL,ZESTRIL) 10 MG tablet [Pharmacy Med Name: LISINOPRIL 10 MG TABLET] 30 tablet 11     Sig: TAKE 1 TABLET BY MOUTH EVERY DAY       Ace Inhibitors Refill Protocol Failed - 7/21/2020 10:57 AM        Failed - Serum Potassium in past 12 months     No results found for: LN-POTASSIUM          Failed - Serum Creatinine in past 12 months     Creatinine   Date Value Ref Range Status   05/07/2019 0.88 0.70 - 1.30 mg/dL Final             Passed - PCP or prescribing provider visit in past 12 months       Last office visit with prescriber/PCP: 2/6/2017 Davy Houston MD OR same dept: Visit date not found OR same specialty: Visit date not found  Last physical: 5/7/2019 Last MTM visit: Visit date not found   Next visit within 3 mo: Visit date not found  Next physical within 3 mo: Visit date not found  Prescriber OR PCP: Davy Houston MD  Last diagnosis associated with med order: 1. Hypertension, unspecified type  - lisinopriL (PRINIVIL,ZESTRIL) 10 MG tablet [Pharmacy Med Name: LISINOPRIL 10 MG TABLET]; TAKE 1 TABLET BY MOUTH EVERY DAY  Dispense: 30 tablet; Refill: 0    If protocol passes may refill for 12 months if within 3 months of last provider visit (or a total of 15 months).             Passed - Blood pressure filed in past 12 months     BP Readings from Last 1 Encounters:   12/10/19 152/78

## 2021-06-10 NOTE — TELEPHONE ENCOUNTER
RN cannot approve Refill Request    RN can NOT refill this medication Protocol failed and NO refill given. Last office visit: 2/6/2017 Davy Houston MD Last Physical: 5/7/2019 Last MTM visit: Visit date not found Last visit same specialty: Visit date not found.  Next visit within 3 mo: Visit date not found  Next physical within 3 mo: Visit date not found      Cait Tapia, [facility] Triage/Med Refill 8/3/2020    Requested Prescriptions   Pending Prescriptions Disp Refills     lisinopriL (PRINIVIL,ZESTRIL) 10 MG tablet 30 tablet 0     Sig: Take 1 tablet (10 mg total) by mouth daily.       Ace Inhibitors Refill Protocol Failed - 8/3/2020  9:30 AM        Failed - Serum Potassium in past 12 months     No results found for: LN-POTASSIUM          Failed - Serum Creatinine in past 12 months     Creatinine   Date Value Ref Range Status   05/07/2019 0.88 0.70 - 1.30 mg/dL Final             Passed - PCP or prescribing provider visit in past 12 months       Last office visit with prescriber/PCP: 2/6/2017 Davy Houston MD OR same dept: Visit date not found OR same specialty: Visit date not found  Last physical: 5/7/2019 Last MTM visit: Visit date not found   Next visit within 3 mo: Visit date not found  Next physical within 3 mo: Visit date not found  Prescriber OR PCP: Davy Houston MD  Last diagnosis associated with med order: 1. Hypertension, unspecified type  - lisinopriL (PRINIVIL,ZESTRIL) 10 MG tablet; Take 1 tablet (10 mg total) by mouth daily.  Dispense: 30 tablet; Refill: 0    If protocol passes may refill for 12 months if within 3 months of last provider visit (or a total of 15 months).             Passed - Blood pressure filed in past 12 months     BP Readings from Last 1 Encounters:   12/10/19 152/78

## 2021-06-10 NOTE — TELEPHONE ENCOUNTER
I will refill the blood pressure medication but he needs an office visit or a physical in the near future.

## 2021-06-15 PROBLEM — B00.9 HERPES SIMPLEX: Status: ACTIVE | Noted: 2017-02-06

## 2021-06-15 PROBLEM — E78.5 HYPERLIPIDEMIA: Status: ACTIVE | Noted: 2017-02-06

## 2021-06-15 PROBLEM — N52.9 MALE ERECTILE DISORDER: Status: ACTIVE | Noted: 2017-02-06

## 2021-06-16 PROBLEM — K22.2 ESOPHAGEAL STRICTURE: Status: ACTIVE | Noted: 2019-05-07

## 2021-06-17 NOTE — PATIENT INSTRUCTIONS - HE
Patient Instructions by Noemí Peters PA-C at 9/24/2019  1:10 PM     Author: Noemí Peters PA-C Service: -- Author Type: Physician Assistant    Filed: 9/24/2019  1:51 PM Encounter Date: 9/24/2019 Status: Signed    : Noemí Peters PA-C (Physician Assistant)       Patient Education     Understanding Black-and-Blue Nails  The big toe is the one most often injured resulting in a black-and-blue nail. Bruised, broken blood vessels cause the black-and-blue colors under the nail. If you had a sudden injury, your toe can be very painful.  How are black-and-blue nails diagnosed?   Your healthcare provider will talk with you about your symptoms and physical activities. He or she may press the area at the end of the toe to determine the extent of pain. Your toe and foot will be examined for any signs of infection. If a fracture or a bone spur is suspected, X-rays may be needed. If small black spots are present under the nail, other problems may need to be ruled out.  Treatment for black-and-blue nails  If your pain is severe, your healthcare provider may remove the nail. Or he or she may drill a hole in the nail to let the fluid drain from underneath. This relieves the pressure. A local anesthetic may be used first. Pain may also be relieved with prescription medicines. Soaking or icing the area may also help. If pain is not severe, you may not need treatment. The nail can be thinned or left alone to fall off on its own. A new nail should grow to replace it.  Preventing black-and-blue nails  You can prevent many nail problems by wearing the right shoes and trimming your nails properly. To help prevent infection, keep your feet clean and dry. If you have diabetes, talk with your healthcare provider before doing any foot self-care.    The right shoes. Get your feet measured (your size may change as you age). Wear shoes that are supportive and roomy enough for your toes to wiggle. Look for shoes made of  natural materials such as leather, which let your feet breathe.    Proper trimming. To prevent problems, trim your toenails straight across without cutting down into the corners. If you cant trim your own nails, ask a healthcare provider to do so for you.  Date Last Reviewed: 6/1/2019 2000-2019 [employer]. [address] [place] [address], [place] [address]. All rights reserved. This information is not intended as a substitute for professional medical care. Always follow your healthcare professional's instructions.

## 2021-06-17 NOTE — PROGRESS NOTES
MALE PREVENTATIVE EXAM    Assessment and Plan:   ASSESSMENT:  1. Herpes simplex  Patient gets occasional outbreaks of cold sores, Valtrex as needed.  - valACYclovir (VALTREX) 500 MG tablet; Take one twice daily as needed for outbreaks  Dispense: 30 tablet; Refill: 0    2. Physical exam  Overall the patient has good health habits.    3. Hyperlipidemia  Well-controlled on simvastatin    4. Hypertension  Controlled on lisinopril.    5. Dysphagia  Long-standing history of having difficulty swallowing, this is gradually progressively worsened over time.  Multiple potential etiologies including GERD, and it could be esophageal anatomic issues.  - Ambulatory Referral for Upper GI Endoscopy    6.  Male sexual difficulty  Patient has had only partial but not optimal response to either Viagra or Cialis.      PLAN:   1.  Laboratory studies recently performed reviewed with the patient.  2.  EGD  3.  Levitra 20 mg to see if this is more helpful, if this still has suboptimal response we could either try a different agent or consider urology referral.  4.  Otherwise continue the patient on the same medications.  5.  Patient should otherwise be seen yearly.    Next follow up:  No Follow-up on file.    Immunization Review  Adult Imm Review: Missing doses of tdap    I discussed the following with the patient:   Adult Healthy Living: Importance of regular exercise    I have had an Advance Directives discussion with the patient.    Subjective:   Chief Complaint: Jhoan Renteria is an 52 y.o. male here for a preventative health visit.     HPI:    Dysphagia: He has continued to experience difficulty with swallowing his food. He does note, however, that he should slow down and take smaller bites of his food. Past this, however, he feels as though it is abnormal as oftentimes he has pain upon swallowing and often feels as though he is going to vomit up what he consumes. He denies any symptoms of heartburn.     Health Maintenance: His  last tetanus shot was in February of 2008 and is therefore due for another; he agrees to the immunization.     Healthy Habits  Are you taking a daily aspirin? Yes,baby   Do you typically exercising at least 40 min, 3-4 times per week?  yes  Do you usually eat at least 4 servings of fruit and vegetables a day, include whole grains and fiber and avoid regularly eating high fat foods? Yes  Have you had an eye exam in the past two years? NO  Do you see a dentist twice per year? Yes  Do you have any concerns regarding sleep? No    Safety Screen  If you own firearms, are they secured in a locked gun cabinet or with trigger locks? Yes  Do you feel you are safe where you are living?: Yes (4/16/2018  2:55 PM)  Do you feel you are safe in your relationship(s)?: Yes (4/16/2018  2:55 PM)    Review of Systems:   He continues to take simvastatin for cholesterol control. His blood pressure is well-controlled on a low dose of lisinopril. He continues to experience frequent cold sores for which he uses Valtrex. He continues to take 100 mg of Viagra as needed for sexual activity; it only moderately works. He does note that his weight has slightly increased but he and his family or going to try and start eating healthier. He has no concerns with regard to his vision or hearing. He denies any seasonal allergies. He has no concerns with regard to his bowel and bladder habits.   All other systems negative.     PFSH:  Surgical: He has had a right sided foot surgery for a bone spur.     Cancer Screening       Status Date      COLONOSCOPY Next Due 10/13/2019      Done 10/13/2014 ENDOSCOPY, COLON        Patient Care Team:  Davy Houston MD as PCP - General (Family Medicine)    History     Reviewed By Date/Time Sections Reviewed    Davy Houston MD 4/16/2018  3:29 PM Tobacco, Alcohol, Drug Use, Sexual Activity, Family, Social Documentation, Socioeconomic    Davy Houston MD 4/16/2018  3:28 PM Medical, Surgical    Marci Lange, CAROLYN  "4/16/2018  3:20 PM Tobacco        Objective:   Vital Signs:   Visit Vitals     /74     Pulse 70     Ht 6' 1\" (1.854 m)     Wt 213 lb (96.6 kg)     BMI 28.1 kg/m2        PHYSICAL EXAM  Constitutional:   Reveals a pleasant male that appears stated age.  Vitals: per nursing notes.  HEENT: Right Ear: External ear normal.   Left Ear: External ear normal.   Nose: Nose normal.   Mouth/Throat: Oropharynx is clear and moist.   Eyes: Conjunctivae and EOM are normal. Pupils are equal, round, and reactive to light. Right eye exhibits no discharge. Left eye exhibits no discharge.   Neck:  Supple, no carotid bruits or adenopathy.  Back:  No spine or CVA pain.  Thorax:  No bony deformities.  Lungs: Clear to A&P without rales or wheezes.  Respiratory effort normal.  Cardiac:   Regular rate and rhythm, normal S1, S2, no murmur or gallop.  Abdomen:  Soft, active bowel sounds without bruits, mass, or tenderness.  Extremities:   No peripheral edema, pulses in the feet intact.    Skin:  No jaundice, peripheral cyanosis or lesions to suggest malignancy.  Neuro:  Alert and oriented. Cranial nerves, motor, sensory exams are intact.  No gross focal deficits.  Psychiatric:  Memory intact, mood appropriate.    The 10-year ASCVD risk score ([place] DC Jr, et al., 2013) is: 2.9%    Values used to calculate the score:      Age: 52 years      Sex: Male      Is Non- : No      Diabetic: No      Tobacco smoker: No      Systolic Blood Pressure: 104 mmHg      Is BP treated: Yes      HDL Cholesterol: 61 mg/dL      Total Cholesterol: 189 mg/dL       Medication List          These changes are accurate as of 4/16/18  5:08 PM.  If you have any questions, ask your nurse or doctor.               START taking these medications          vardenafil 20 MG tablet   Also known as:  LEVITRA   INSTRUCTIONS:  Take 1 tablet (20 mg total) by mouth daily as needed for erectile dysfunction.   Started by:  Davy Houston MD             CHANGE " how you take these medications          valACYclovir 500 MG tablet   Also known as:  VALTREX   INSTRUCTIONS:  Take one twice daily as needed for outbreaks   What changed:  See the new instructions.   Changed by:  Davy Houston MD             CONTINUE taking these medications          aspirin 81 MG EC tablet   INSTRUCTIONS:  Take 81 mg by mouth daily.           cholecalciferol (vitamin D3) 1,000 unit tablet   INSTRUCTIONS:  Take 1,000 Units by mouth daily.           lisinopril 5 MG tablet   Also known as:  PRINIVIL,ZESTRIL   INSTRUCTIONS:  Take 1 tablet (5 mg total) by mouth daily.   Doctor's comments:  04/07/2018--Patient due for physical before next med refill.           multivitamin with minerals 9 mg iron-400 mcg Tab tablet   Also known as:  THERA-M   INSTRUCTIONS:  Take 1 tablet by mouth daily.           sildenafil 100 MG tablet   Also known as:  VIAGRA   INSTRUCTIONS:  Take 1 tablet (100 mg total) by mouth daily as needed for erectile dysfunction.   Doctor's comments:  Please call patient when ready for  and please discontinue RX for Cialis due to formulary change.           simvastatin 10 MG tablet   Also known as:  ZOCOR   INSTRUCTIONS:  Take 1 tablet (10 mg total) by mouth daily.   Doctor's comments:  Physical visit needed for future refills                Where to Get Your Medications      These medications were sent to [facility]/pharmacy #[id number] [place], [place] - [address]  [address] [facility] [address]     Phone:  [phone]      valACYclovir 500 MG tablet     vardenafil 20 MG tablet             Additional Screenings Completed Today:       DATA REVIEWED:  ADDITIONAL HISTORY SUMMARIZED (2): None.  DECISION TO OBTAIN EXTRA INFORMATION (1): None.   RADIOLOGY TESTS (1): None.  LABS (1): Reviewed labs 4/9/18; lipid cascade, comprehensive metabolic panel.   MEDICINE TESTS (1): None.  INDEPENDENT REVIEW (2 each): None.     The visit lasted a total of 23 minutes face to face with the patient.  Over 50% of the time was spent counseling and educating the patient about health maintenance.    I, Dinesh Flowers, am scribing for and in the presence of, Dr. Houston.    I, Dr. Houston, personally performed the services described in this documentation, as scribed by Dinesh Flowers in my presence, and it is both accurate and complete.    Total Data Points: 1

## 2021-06-19 NOTE — LETTER
Letter by Davy Houston MD at      Author: Davy Houston MD Service: -- Author Type: --    Filed:  Encounter Date: 5/8/2019 Status: (Other)         Jhoan Renteria  [address] [facility] [address]             May 8, 2019         Dear Mr. Renteria,    Below are the results from your recent visit: Sugars good at 96, no diabetes.  Liver and kidney tests are normal.  Cholesterol is well controlled, for now stay on the same dose of simvastatin.  Once we have the coronary calcium score I may need to make changes or modifications but will let you know.    Resulted Orders   Comprehensive Metabolic Panel   Result Value Ref Range    Sodium 144 136 - 145 mmol/L    Potassium 4.6 3.5 - 5.0 mmol/L    Chloride 106 98 - 107 mmol/L    CO2 30 22 - 31 mmol/L    Anion Gap, Calculation 8 5 - 18 mmol/L    Glucose 96 70 - 125 mg/dL    BUN 12 8 - 22 mg/dL    Creatinine 0.88 0.70 - 1.30 mg/dL    GFR MDRD Af Amer >60 >60 mL/min/1.73m2    GFR MDRD Non Af Amer >60 >60 mL/min/1.73m2    Bilirubin, Total 0.5 0.0 - 1.0 mg/dL    Calcium 10.2 8.5 - 10.5 mg/dL    Protein, Total 7.3 6.0 - 8.0 g/dL    Albumin 4.4 3.5 - 5.0 g/dL    Alkaline Phosphatase 49 45 - 120 U/L    AST 31 0 - 40 U/L    ALT 34 0 - 45 U/L    Narrative    Fasting Glucose reference range is 70-99 mg/dL per  American Diabetes Association (ADA) guidelines.   Lipid Cascade   Result Value Ref Range    Cholesterol 179 <=199 mg/dL    Triglycerides 93 <=149 mg/dL    HDL Cholesterol 65 >=40 mg/dL    LDL Calculated 95 <=129 mg/dL    Patient Fasting > 8hrs? Yes             Please call with questions or contact us using [employer].    Sincerely,        Electronically signed by Davy Houston MD

## 2021-06-19 NOTE — LETTER
Letter by Davy Houston MD at      Author: Davy Houston MD Service: -- Author Type: --    Filed:  Encounter Date: 5/21/2019 Status: (Other)         Jhoan Renteria  [address] [facility] [address]             May 21, 2019         Dear Mr. Renteria,    Below are the results from your recent visit: The heart test for coronary calcium is exceptionally good with a score of 0 which means there is no plaque is no buildup of any sort in your coronary arteries.  Because of this, there really is no benefit to taking either the aspirin or simvastatin and you can discontinue both of them.    Resulted Orders   CT Cardiac Calcium Score   Result Value Ref Range    Agatston Score of Left Main 0     Agatston Score of the Left Anterior Descending 0     Agatston Score of Circumflex 0     Agatston Score of Right Coronary Artery 0     Total Score 0.00     Narrative      The total Agatston calcium score is 0. A calcium score of zero places   the individual in the lowest quartile when compared to an age and gender   matched control group and implies a low risk of cardiac events in the next   10 years. (less than 1% per year).    Please see separate report by radiology for additional findings.               Please call with questions or contact us using [employer].    Sincerely,        Electronically signed by Davy Houston MD

## 2021-06-19 NOTE — LETTER
Letter by Davy Houston MD at      Author: Davy Houston MD Service: -- Author Type: --    Filed:  Encounter Date: 5/21/2019 Status: (Other)         Jhoan Renteria  [address] [facility] [address]             May 21, 2019         Dear Mr. Renteria,    Below are the results from your recent visit: When the coronary calcium score is performed, structures around the heart looked at, all of these are completely normal.    Resulted Orders   CT Chest Over Read    Narrative    OVERREAD: DETAILED [facility] EXTRACARDIAC OVERREAD OF CARDIAC CT   DATE/TIME: 5/17/2019 7:03 AM    INDICATION: Hyperlipidemia.  TECHNIQUE: Dose reduction techniques were used.  CONTRAST: None.  COMPARISON: None.    FINDINGS:    LIMITED CHEST: Unremarkable.  LIMITED MEDIASTINUM: Unremarkable.  LIMITED UPPER ABDOMEN: Unremarkable.  MSK: Less than 1 cm mid thoracic vertebral body hemangioma.      Impression    CONCLUSION:    1.  No significant incidental extracardiac findings.  2.  Please refer to cardiologist's dictation for the cardiac CT report.              Please call with questions or contact us using [employer].    Sincerely,        Electronically signed by Davy Houston MD

## 2021-06-20 NOTE — LETTER
Letter by Davy Houston MD at      Author: Davy Houston MD Service: -- Author Type: --    Filed:  Encounter Date: 8/25/2020 Status: (Other)         Jhona Renteria  [address] [facility] [address]             August 25, 2020         Dear Mr. Renteria,    Below are the results from your recent visit: Sugar is good at 87, no diabetes.  Liver and kidney tests are normal.  Overall the cholesterol is well controlled, just the slightest elevation of triglycerides, but overall good cholesterol.  The total is elevated only because the HDL so-called good cholesterol is so high but overall you do have good cholesterol.    Resulted Orders   Comprehensive Metabolic Panel   Result Value Ref Range    Sodium 142 136 - 145 mmol/L    Potassium 5.0 3.5 - 5.0 mmol/L    Chloride 104 98 - 107 mmol/L    CO2 30 22 - 31 mmol/L    Anion Gap, Calculation 8 5 - 18 mmol/L    Glucose 87 70 - 125 mg/dL    BUN 9 8 - 22 mg/dL    Creatinine 0.88 0.70 - 1.30 mg/dL    GFR MDRD Af Amer >60 >60 mL/min/1.73m2    GFR MDRD Non Af Amer >60 >60 mL/min/1.73m2    Bilirubin, Total 0.5 0.0 - 1.0 mg/dL    Calcium 10.1 8.5 - 10.5 mg/dL    Protein, Total 7.5 6.0 - 8.0 g/dL    Albumin 4.4 3.5 - 5.0 g/dL    Alkaline Phosphatase 53 45 - 120 U/L    AST 26 0 - 40 U/L    ALT 23 0 - 45 U/L    Narrative    Fasting Glucose reference range is 70-99 mg/dL per  American Diabetes Association (ADA) guidelines.   Lipid Cascade   Result Value Ref Range    Cholesterol 215 (H) <=199 mg/dL    Triglycerides 152 (H) <=149 mg/dL    HDL Cholesterol 58 >=40 mg/dL    LDL Calculated 127 <=129 mg/dL    Patient Fasting > 8hrs? No           Please call with questions or contact us using [employer].    Sincerely,        Electronically signed by Davy Houston MD

## 2021-06-27 NOTE — PROGRESS NOTES
Progress Notes by Davy Houston MD at 5/7/2019  8:50 AM     Author: Davy Houston MD Service: -- Author Type: Physician    Filed: 5/7/2019 10:13 AM Encounter Date: 5/7/2019 Status: Signed    : Davy Houston MD (Physician)       MALE PREVENTATIVE EXAM    Assessment and Plan:       1. Screen for colon cancer  Patient will be due this fall for colorectal cancer screening.  - Ambulatory referral for Colonoscopy    2. Physical exam  Overall has good health habits.  - CT Cardiac Calcium Score; Future    3. Hyperlipidemia  Low-dose simvastatin  - CT Cardiac Calcium Score; Future  - Comprehensive Metabolic Panel  - Lipid Cascade    4. Hypertension  Suboptimal control it is not clear if the patient has better out of the office control.  - CT Cardiac Calcium Score; Future    5. Herpes simplex  Patient gets cold sores and canker sores typically stress related    6. Male erectile disorder  Viagra has been helpful.  - sildenafil, antihypertensive, (REVATIO) 20 mg tablet; Take 1 tablet (20 mg total) by mouth 3 (three) times a day.  Dispense: 90 tablet; Refill: 1    7. Esophageal stricture  Diagnosed 1 year ago, PPI therapy  - pantoprazole (PROTONIX) 40 MG tablet; Take 1 tablet (40 mg total) by mouth daily.  Dispense: 90 tablet; Refill: 3    PLAN:  1.  Laboratory studies as above.  2.  Patient will monitor his blood pressure out of the office  3.  CT coronary calcium score.  4.  Depending on the results of that score, if the patient is low risk he probably can discontinue aspirin, but if moderate to high risk that would continue aspirin and would switch to a moderate or high intensity statin.  5.  Generic Viagra prescription given.  6.  Patient otherwise should be seen yearly.        Next follow up:  Return in about 1 year (around 5/7/2020) for Annual physical.    Immunization Review  Adult Imm Review: No immunizations due today    I discussed the following with the patient:   Adult Healthy Living: Importance of  regular exercise  Healthy nutrition        Subjective:   Chief Complaint: Jhoan Renteria is an 53 y.o. male here for a preventative health visit.     HPI:  Patient's blood pressure is 170/74 and 167/87 in the office today. He notes that he did not take his blood pressure medication this morning and he gets worked up when visiting the doctor. He continues to take Viagra for erectile disorder which is effective. He has had issues with canker sores but taking Valtrex and managing his stress has reduced his outbreaks. He mentions that he had an upper GI endoscopy a year ago and since then he has not had swallowing issues. He had bone spur surgery on his right foot years ago. He continues to experience aching pain in his right foot but it does not limit him. He has been dealing with neck and back pain and has been going to a chiropractor. He denies seasonal allergies. He denies any concerns with his hearing. He had an eye exam and he has the beginning of cataracts. He denies any significant changes to his bladder habits.     PFSH:  Family: His mother  of ovarian cancer. His mother also had atrial fibrillation. He has a family history of hyperlipidemia and hypertension.   Surgery: He had right foot surgery.     Healthy Habits  Are you taking a daily aspirin? Yes  Do you typically exercising at least 40 min, 3-4 times per week?  Yes  Do you usually eat at least 4 servings of fruit and vegetables a day, include whole grains and fiber and avoid regularly eating high fat foods? NO  Have you had an eye exam in the past two years? Yes  Do you see a dentist twice per year? Yes  Do you have any concerns regarding sleep? No    Safety Screen  If you own firearms, are they secured in a locked gun cabinet or with trigger locks? Yes  Do you feel you are safe where you are living?: Yes (2019  8:45 AM)  Do you feel you are safe in your relationship(s)?: Yes (2019  8:45 AM)      Review of Systems:  Please see above.  The  "rest of the review of systems are negative for all systems.     Cancer Screening       Status Date      COLONOSCOPY Next Due 10/13/2019      Done 10/13/2014 ENDOSCOPY, COLON          Patient Care Team:  Davy Houston MD as PCP - General (Family Medicine)        History     Reviewed By Date/Time Sections Reviewed    Davy Houston MD 5/7/2019  8:59 AM Tobacco, Alcohol, Drug Use, Sexual Activity, Social Documentation, Socioeconomic, Lifestyle    Davy Houston MD 5/7/2019  8:58 AM Medical, Surgical    Marci Cruz [facility] 5/7/2019  8:47 AM Tobacco            Objective:   Vital Signs:   Visit Vitals  /87   Pulse 64   Ht 6' 0.25\" (1.835 m)   Wt 205 lb 8 oz (93.2 kg)   SpO2 99%   BMI 27.68 kg/m           PHYSICAL EXAM  Constitutional:   Reveals a healthy appearing male.  Vitals: per nursing notes.  HEENT: Right Ear: External ear normal.   Left Ear: External ear normal.   Nose: Nose normal.   Mouth/Throat: Oropharynx is clear and moist.   Eyes: Conjunctivae and EOM are normal. Pupils are equal, round, and reactive to light. Right eye exhibits no discharge. Left eye exhibits no discharge.   Neck:  Supple, no carotid bruits or adenopathy.  Back:  No spine or CVA pain.  Thorax:  No bony deformities.  Lungs: Clear to A&P without rales or wheezes.  Respiratory effort normal.  Cardiac:   Regular rate and rhythm, normal S1, S2, no murmur or gallop.  Abdomen:  Soft, active bowel sounds without bruits, mass, or tenderness.  Extremities:   No peripheral edema, pulses in the feet intact.    Skin:  No jaundice, peripheral cyanosis or lesions to suggest malignancy.  Neuro:  Alert and oriented. Cranial nerves, motor, sensory exams are intact.  No gross focal deficits.  Psychiatric:  Memory intact, mood appropriate.    ADDITIONAL HISTORY SUMMARIZED (2): Reviewed gastroenterology note from 5/04/18: EGD: Dilatation of esophagus, distal esophagitis likely acid reflux related.   DECISION TO OBTAIN EXTRA INFORMATION (1): " None.   RADIOLOGY TESTS (1): Ordered coronary calcium CT scan.   LABS (1): Ordered labs.   MEDICINE TESTS (1): None.  INDEPENDENT REVIEW (2 each): None.     Total data points =  4    Total time was 31 minutes, greater than 50% counseling and coordinating care regarding the above issues.        The 10-year ASCVD risk score (Far[place]aneh BLACKMON Jr., et al., 2013) is: 7.4%    Values used to calculate the score:      Age: 53 years      Sex: Male      Is Non- : No      Diabetic: No      Tobacco smoker: No      Systolic Blood Pressure: 167 mmHg      Is BP treated: Yes      HDL Cholesterol: 61 mg/dL      Total Cholesterol: 189 mg/dL         Medication List           Accurate as of 5/7/19 10:09 AM. If you have any questions, ask your nurse or doctor.               START taking these medications    pantoprazole 40 MG tablet  Also known as:  PROTONIX  INSTRUCTIONS:  Take 1 tablet (40 mg total) by mouth daily.  Started by:  Davy Houston MD        sildenafil (antihypertensive) 20 mg tablet  Also known as:  REVATIO  INSTRUCTIONS:  Take 1 tablet (20 mg total) by mouth 3 (three) times a day.  Replaces:  sildenafil 100 MG tablet  Started by:  Davy Houston MD           CONTINUE taking these medications    aspirin 81 MG EC tablet  INSTRUCTIONS:  Take 81 mg by mouth daily.        lisinopril 5 MG tablet  Also known as:  PRINIVIL,ZESTRIL  INSTRUCTIONS:  TAKE 1 TABLET BY MOUTH EVERY DAY  Doctor's comments:  No more refills without seeing MD        simvastatin 10 MG tablet  Also known as:  ZOCOR  INSTRUCTIONS:  TAKE 1 TABLET BY MOUTH EVERY DAY  Doctor's comments:  No more refills without seeing MD        valACYclovir 500 MG tablet  Also known as:  VALTREX  INSTRUCTIONS:  TAKE ONE TWICE DAILY AS NEEDED FOR OUTBREAKS  Doctor's comments:  No more refills without seen MD           STOP taking these medications    cholecalciferol (vitamin D3) 1,000 unit tablet  Stopped by:  Davy Houston MD     multivitamin with minerals  9 mg iron-400 mcg Tab tablet  Also known as:  THERA-M  Stopped by:  Davy Houston MD     sildenafil 100 MG tablet  Also known as:  VIAGRA  Replaced by:  sildenafil (antihypertensive) 20 mg tablet  Stopped by:  Davy Houston MD     vardenafil 20 MG tablet  Also known as:  LEVITRA  Stopped by:  Davy Houston MD           Where to Get Your Medications      These medications were sent to [facility]/pharmacy #[id number] [place], [place] - [address]  [address] [facility] [address]    Phone:  [phone]     pantoprazole 40 MG tablet     You can get these medications from any pharmacy    Bring a paper prescription for each of these medications    sildenafil (antihypertensive) 20 mg tablet         Additional Screenings Completed Today:     By signing my name below, I, Jerson Rodríguez, attest that this documentation has been prepared under the direction and in the presence of Dr. Davy Houston.  Electronic Signature: Elisabeth Velasco. 5/07/2019 8:49 AM.    I, Dr. Houston, personally performed the services described in this documentation. All medical record entries made by the scribe were at my direction and in my presence. I have reviewed the chart and discharge instructions (if applicable) and agree that the record reflects my personal performance and is accurate and complete.

## 2021-06-28 NOTE — PROGRESS NOTES
Progress Notes by Noemí Peters PA-C at 9/24/2019  1:10 PM     Author: Noemí Peters PA-C Service: -- Author Type: Physician Assistant    Filed: 9/24/2019  3:13 PM Encounter Date: 9/24/2019 Status: Signed    : Noemí Peters PA-C (Physician Assistant)         Assessment:       1. Injury of nail bed of toe  Ambulatory referral to Podiatry     Medical Decision Making  Patient presents with an injury to the left great toe.  The pain and throbbing appears to be improving on its own.  Recommend conservative management at this time with follow-up with podiatry if no symptom improvement.      Plan:       Warm water soaks of the toe.  Foot elevation and cold compresses to help with swelling.  Over-the-counter analgesics discussed.  Discussed signs of worsening symptoms and when to follow-up with PCP if no symptom improvement.      Patient Instructions   Patient Education     Understanding Black-and-Blue Nails  The big toe is the one most often injured resulting in a black-and-blue nail. Bruised, broken blood vessels cause the black-and-blue colors under the nail. If you had a sudden injury, your toe can be very painful.  How are black-and-blue nails diagnosed?   Your healthcare provider will talk with you about your symptoms and physical activities. He or she may press the area at the end of the toe to determine the extent of pain. Your toe and foot will be examined for any signs of infection. If a fracture or a bone spur is suspected, X-rays may be needed. If small black spots are present under the nail, other problems may need to be ruled out.  Treatment for black-and-blue nails  If your pain is severe, your healthcare provider may remove the nail. Or he or she may drill a hole in the nail to let the fluid drain from underneath. This relieves the pressure. A local anesthetic may be used first. Pain may also be relieved with prescription medicines. Soaking or icing the area may also help. If pain is  not severe, you may not need treatment. The nail can be thinned or left alone to fall off on its own. A new nail should grow to replace it.  Preventing black-and-blue nails  You can prevent many nail problems by wearing the right shoes and trimming your nails properly. To help prevent infection, keep your feet clean and dry. If you have diabetes, talk with your healthcare provider before doing any foot self-care.    The right shoes. Get your feet measured (your size may change as you age). Wear shoes that are supportive and roomy enough for your toes to wiggle. Look for shoes made of natural materials such as leather, which let your feet breathe.    Proper trimming. To prevent problems, trim your toenails straight across without cutting down into the corners. If you cant trim your own nails, ask a healthcare provider to do so for you.  Date Last Reviewed: 6/1/2019 2000-2019 [employer]. [address] [place] [address]. All rights reserved. This information is not intended as a substitute for professional medical care. Always follow your healthcare professional's instructions.             Subjective:       Jhoan Renteria is a 54 y.o. male here for evaluation of toe pain.  Onset of symptoms was 2 days ago.  Patient slipped while walking up the stairs and it lifted his toenail up off of his left first toe.  He has since developed pain, swelling, and bruising of the left first toe.  Patient notes that the throbbing sensation in the foot has been improving.    The following portions of the patient's history were reviewed and updated as appropriate: allergies, current medications and problem list.    Review of Systems  Pertinent items are noted in HPI.     Allergies  No Known Allergies    Family History   Problem Relation Age of Onset   ? Hypertension Mother    ? Ovarian cancer Mother    ? Atrial fibrillation Mother         Ablation   ? Hyperlipidemia Brother    ? Hypertension Brother    ?  Heart disease Paternal Uncle    ? Stroke Paternal Uncle    ? Prostate cancer Paternal Uncle    ? Cancer Maternal Grandmother    ? Heart disease Maternal Grandfather    ? Dementia Paternal Grandmother    ? Cancer Paternal Grandmother    ? Parkinsonism Paternal Grandmother    ? Stroke Paternal Grandfather 52   ? Heart attack Paternal Grandfather 58   ? Arthritis Brother    ? Hypertension Brother    ? Hyperlipidemia Brother    ? Hypertension Brother    ? Hyperlipidemia Brother        Social History     Socioeconomic History   ? Marital status:      Spouse name: None   ? Number of children: 4   ? Years of education: None   ? Highest education level: None   Occupational History   ? Occupation: Computer Programming     Comment: [employer]   Social Needs   ? Financial resource strain: None   ? Food insecurity:     Worry: None     Inability: None   ? Transportation needs:     Medical: None     Non-medical: None   Tobacco Use   ? Smoking status: Never Smoker   ? Smokeless tobacco: Never Used   Substance and Sexual Activity   ? Alcohol use: Yes     Alcohol/week: 3.0 standard drinks     Types: 3 Cans of beer per week   ? Drug use: No   ? Sexual activity: Yes     Partners: Female   Lifestyle   ? Physical activity:     Days per week: None     Minutes per session: None   ? Stress: None   Relationships   ? Social connections:     Talks on phone: None     Gets together: None     Attends Restorationism service: None     Active member of club or organization: None     Attends meetings of clubs or organizations: None     Relationship status: None   ? Intimate partner violence:     Fear of current or ex partner: None     Emotionally abused: None     Physically abused: None     Forced sexual activity: None   Other Topics Concern   ? None   Social History Narrative    Diet- Regular,         Exercise- Walking, weightlifting         Objective:       /81 (Patient Position: Sitting)   Pulse 74   Temp 98.4  F (36.9  C)    Resp 15   Wt 211 lb (95.7 kg)   SpO2 97%   BMI 28.42 kg/m    General appearance: alert, appears stated age, cooperative, no distress and non-toxic  Extremities: No obvious deformity, no cyanosis or edema; full range of motion of all foot joints without difficulty  Pulses: 2+ and symmetric  Skin: Ecchymosis and swelling at the left great toe, nail still appears attached; hardened white/yellow buildup underneath the medial surface of the nail

## 2021-06-29 NOTE — PROGRESS NOTES
Progress Notes by Davy Houston MD at 8/24/2020  2:30 PM     Author: Davy Houston MD Service: -- Author Type: Physician    Filed: 8/24/2020  4:28 PM Encounter Date: 8/24/2020 Status: Signed    : Davy Houston MD (Physician)       MALE PREVENTATIVE EXAM    Assessment and Plan:       1. Physical exam  Patient overall has very good health habits.    2. Esophageal stricture  Protonix daily also for heartburn.  - pantoprazole (PROTONIX) 40 MG tablet; Take 1 tablet (40 mg total) by mouth daily.  Dispense: 90 tablet; Refill: 3    3. Herpes simplex  Cold sores on occasion.  - valACYclovir (VALTREX) 500 MG tablet; TAKE ONE TWICE DAILY AS NEEDED FOR OUTBREAKS  Dispense: 30 tablet; Refill: 1    4. Hyperlipidemia  Elevated but attempting to control with lifestyle.  - Comprehensive Metabolic Panel  - Lipid Cascade    5. Hypertension  Overall well controlled.  - lisinopriL (PRINIVIL,ZESTRIL) 10 MG tablet; Take 1 tablet (10 mg total) by mouth daily.  Dispense: 90 tablet; Refill: 3    6. Callus of foot  2 calluses on the bottom of the right foot 1 underneath the left foot.    7. Male erectile disorder     - sildenafil (REVATIO) 20 mg tablet; Take 1 tablet (20 mg total) by mouth 3 (three) times a day.  Dispense: 90 tablet; Refill: 1    PLAN:  1.  I renewed the patient's medications as above without changes.  2.  Laboratory studies as above.  3.  Seizure as below 2 curette the foot calluses, of note this could be repeated here, potential further option is referral to either dermatology podiatry or foot and ankle specialist for repeat callus treatment.  4.  Patient otherwise should be seen yearly.      Next follow up:  No follow-ups on file.    Immunization Review  Adult Imm Review: No immunizations due today      I discussed the following with the patient:   Adult Healthy Living: Importance of regular exercise  Healthy nutrition        Subjective:   Chief Complaint: Jhoan Renteria is an 55 y.o. male here for a  preventative health visit.     HPI: Patient comes in with a number of concerns.  He has had calluses on both feet, he saw a podiatrist he was having some foot issues those have been essentially resolved however he has 2 calluses the bottom of the right foot 1 bottom of the left foot and they can be a bit uncomfortable and painful, his wife has done some curetting, but he would like them to be looked at.  He was told that the podiatrist here do not do the actual procedure but he could see a foot and ankle specialist but we could do some curetting here at this time.    Patient does have underlying hypertension well-controlled.    He gets occasional cold sores Valtrex has been helpful.    He has benefited from generic Viagra he does need that renewed.    He has a history of an esophageal stricture, he also does notice that he does have some heartburn symptoms and has noticed that after he has stopped his PPI medication and he will be on that indefinitely.    Otherwise no other significant change in his health status, reviewed his allergies medications active medical problems past medical history past surgical history family history and social history.    Healthy Habits  Are you taking a daily aspirin? Yes  Do you typically exercising at least 40 min, 3-4 times per week?  Yes  Do you usually eat at least 4 servings of fruit and vegetables a day, include whole grains and fiber and avoid regularly eating high fat foods? Yes  Have you had an eye exam in the past two years? Yes  Do you see a dentist twice per year? Yes  Do you have any concerns regarding sleep? No    Safety Screen  If you own firearms, are they secured in a locked gun cabinet or with trigger locks? The patient does not own any firearms  Do you feel you are safe where you are living?: Yes (8/24/2020  2:42 PM)  Do you feel you are safe in your relationship(s)?: Yes (8/24/2020  2:42 PM)      Review of Systems:  Please see above.  The rest of the review of  "systems are negative for all systems.     Cancer Screening     Patient has no health maintenance due at this time          Patient Care Team:  Davy Houston MD as PCP - General (Family Medicine)  Davy Houston MD as Assigned PCP        History     Reviewed By Date/Time Sections Reviewed    Davy Houston MD 8/24/2020  2:50 PM Social Documentation    Davy Houston MD 8/24/2020  2:49 PM Medical, Surgical, Tobacco, Alcohol, Drug Use, Sexual Activity, Family, Socioeconomic, Lifestyle, Relationships    Aurea Mendoza [facility] 8/24/2020  2:44 PM Tobacco            Objective:   Vital Signs:   Visit Vitals  /78 (Patient Site: Right Arm, Patient Position: Sitting, Cuff Size: Adult Regular)   Pulse 76   Ht 6' 1\" (1.854 m)   Wt 199 lb 12.8 oz (90.6 kg)   BMI 26.36 kg/m           PHYSICAL EXAM  Physical Exam   Constitutional: He is oriented to person, place, and time. He appears well-developed and well-nourished.   HENT:   Head: Normocephalic and atraumatic.   Right Ear: External ear normal.   Eyes: Pupils are equal, round, and reactive to light. Conjunctivae and EOM are normal.   Cardiovascular: Normal rate, regular rhythm and normal heart sounds.   Pulmonary/Chest: Effort normal and breath sounds normal.   Musculoskeletal: Normal range of motion.      Comments: There are 2 large calluses the bottom of the right foot one bout of the left foot that are each about 2 x 3 cm.   Neurological: He is alert and oriented to person, place, and time.   Skin: Skin is warm and dry.   Psychiatric: He has a normal mood and affect. His behavior is normal. Judgment and thought content normal.     Seizure.  I used a 15 blade knife to curette and smooth at least somewhat the 2 calluses underneath the right foot the one underneath the left foot.    The 10-year ASCVD risk score ([place] DC Jr., et al., 2013) is: 4.4%    Values used to calculate the score:      Age: 55 years      Sex: Male      Is Non- : " No      Diabetic: No      Tobacco smoker: No      Systolic Blood Pressure: 118 mmHg      Is BP treated: Yes      HDL Cholesterol: 65 mg/dL      Total Cholesterol: 179 mg/dL         Medication List          Accurate as of August 24, 2020  4:26 PM. If you have any questions, ask your nurse or doctor.            CONTINUE taking these medications    lisinopriL 10 MG tablet  Also known as:  PRINIVIL,ZESTRIL  INSTRUCTIONS:  Take 1 tablet (10 mg total) by mouth daily.        pantoprazole 40 MG tablet  Also known as:  Protonix  INSTRUCTIONS:  Take 1 tablet (40 mg total) by mouth daily.        sildenafil 20 mg tablet  Also known as:  Revatio  INSTRUCTIONS:  Take 1 tablet (20 mg total) by mouth 3 (three) times a day.        valACYclovir 500 MG tablet  Also known as:  VALTREX  INSTRUCTIONS:  TAKE ONE TWICE DAILY AS NEEDED FOR OUTBREAKS              Where to Get Your Medications      These medications were sent to [facility]/pharmacy #[id number] - [place], [place] - [address]  [address] [address]    Phone:  [phone]     lisinopriL 10 MG tablet    pantoprazole 40 MG tablet    sildenafil 20 mg tablet    valACYclovir 500 MG tablet         Additional Screenings Completed Today:

## 2021-07-03 NOTE — ADDENDUM NOTE
Addendum Note by Ale Underwood LPN at 3/22/2017  7:05 AM     Author: Ale Underwood LPN Service: -- Author Type: Licensed Nurse    Filed: 3/22/2017  7:05 AM Encounter Date: 2/10/2017 Status: Signed    : Ale Underwood LPN (Licensed Nurse)    Addended by: ALE UNDERWOOD on: 3/22/2017 07:05 AM        Modules accepted: Orders

## 2021-08-23 DIAGNOSIS — I10 HYPERTENSION, UNSPECIFIED TYPE: ICD-10-CM

## 2021-08-23 RX ORDER — LISINOPRIL 10 MG/1
10 TABLET ORAL DAILY
Qty: 90 TABLET | Refills: 0 | Status: SHIPPED | OUTPATIENT
Start: 2021-08-23 | End: 2021-09-28

## 2021-09-19 ENCOUNTER — HEALTH MAINTENANCE LETTER (OUTPATIENT)
Age: 56
End: 2021-09-19

## 2021-09-28 ENCOUNTER — OFFICE VISIT (OUTPATIENT)
Dept: FAMILY MEDICINE | Facility: CLINIC | Age: 56
End: 2021-09-28
Payer: COMMERCIAL

## 2021-09-28 VITALS
HEART RATE: 57 BPM | DIASTOLIC BLOOD PRESSURE: 80 MMHG | HEIGHT: 73 IN | OXYGEN SATURATION: 98 % | SYSTOLIC BLOOD PRESSURE: 136 MMHG | BODY MASS INDEX: 25.82 KG/M2 | WEIGHT: 194.8 LBS

## 2021-09-28 DIAGNOSIS — E78.49 OTHER HYPERLIPIDEMIA: ICD-10-CM

## 2021-09-28 DIAGNOSIS — Z23 NEED FOR VACCINATION: ICD-10-CM

## 2021-09-28 DIAGNOSIS — Z00.00 PHYSICAL EXAM: Primary | ICD-10-CM

## 2021-09-28 DIAGNOSIS — B00.9 HERPES SIMPLEX: ICD-10-CM

## 2021-09-28 DIAGNOSIS — K22.2 ESOPHAGEAL STRICTURE: ICD-10-CM

## 2021-09-28 DIAGNOSIS — I10 HYPERTENSION, UNSPECIFIED TYPE: ICD-10-CM

## 2021-09-28 LAB
ALBUMIN SERPL-MCNC: 4.6 G/DL (ref 3.5–5)
ALP SERPL-CCNC: 44 U/L (ref 45–120)
ALT SERPL W P-5'-P-CCNC: 21 U/L (ref 0–45)
ANION GAP SERPL CALCULATED.3IONS-SCNC: 9 MMOL/L (ref 5–18)
AST SERPL W P-5'-P-CCNC: 26 U/L (ref 0–40)
BILIRUB SERPL-MCNC: 1 MG/DL (ref 0–1)
BUN SERPL-MCNC: 15 MG/DL (ref 8–22)
CALCIUM SERPL-MCNC: 10.3 MG/DL (ref 8.5–10.5)
CHLORIDE BLD-SCNC: 105 MMOL/L (ref 98–107)
CHOLEST SERPL-MCNC: 193 MG/DL
CO2 SERPL-SCNC: 29 MMOL/L (ref 22–31)
CREAT SERPL-MCNC: 0.96 MG/DL (ref 0.7–1.3)
FASTING STATUS PATIENT QL REPORTED: YES
GFR SERPL CREATININE-BSD FRML MDRD: 87 ML/MIN/1.73M2
GLUCOSE BLD-MCNC: 101 MG/DL (ref 70–125)
HDLC SERPL-MCNC: 59 MG/DL
LDLC SERPL CALC-MCNC: 114 MG/DL
POTASSIUM BLD-SCNC: 4.9 MMOL/L (ref 3.5–5)
PROT SERPL-MCNC: 7.4 G/DL (ref 6–8)
SODIUM SERPL-SCNC: 143 MMOL/L (ref 136–145)
TRIGL SERPL-MCNC: 99 MG/DL

## 2021-09-28 PROCEDURE — 99396 PREV VISIT EST AGE 40-64: CPT | Mod: 25 | Performed by: FAMILY MEDICINE

## 2021-09-28 PROCEDURE — 90471 IMMUNIZATION ADMIN: CPT | Performed by: FAMILY MEDICINE

## 2021-09-28 PROCEDURE — 90682 RIV4 VACC RECOMBINANT DNA IM: CPT | Performed by: FAMILY MEDICINE

## 2021-09-28 PROCEDURE — 80053 COMPREHEN METABOLIC PANEL: CPT | Performed by: FAMILY MEDICINE

## 2021-09-28 PROCEDURE — 36415 COLL VENOUS BLD VENIPUNCTURE: CPT | Performed by: FAMILY MEDICINE

## 2021-09-28 PROCEDURE — 80061 LIPID PANEL: CPT | Performed by: FAMILY MEDICINE

## 2021-09-28 RX ORDER — PANTOPRAZOLE SODIUM 40 MG/1
40 TABLET, DELAYED RELEASE ORAL DAILY
Qty: 90 TABLET | Refills: 3 | Status: SHIPPED | OUTPATIENT
Start: 2021-09-28 | End: 2022-09-12

## 2021-09-28 RX ORDER — VALACYCLOVIR HYDROCHLORIDE 500 MG/1
TABLET, FILM COATED ORAL
Qty: 30 TABLET | Refills: 1 | Status: SHIPPED | OUTPATIENT
Start: 2021-09-28 | End: 2022-04-15

## 2021-09-28 RX ORDER — LISINOPRIL 10 MG/1
10 TABLET ORAL DAILY
Qty: 90 TABLET | Refills: 3 | Status: SHIPPED | OUTPATIENT
Start: 2021-09-28 | End: 2022-09-14

## 2021-09-28 ASSESSMENT — MIFFLIN-ST. JEOR: SCORE: 1758.52

## 2021-09-28 NOTE — PROGRESS NOTES
SUBJECTIVE:   CC: Jhoan Renteria is an 56 year old male who presents for preventative health visit.   Patient has been advised of split billing requirements and indicates understanding: Yes       HPI  Patient comes in for his annual physical examination.  No significant change in his health status over the past year or so, his blood pressure is well controlled, he does have a history of an elevated cholesterol however his coronary calcium score 2 years ago was 0.  He has a history of cold sores though if he takes Valtrex right away he can basically get this under good fairly quick control.    He is up-to-date on preventive maintenance issues, we will do the flu shot today he is going to check about the shingles vaccine.  Overall he has very good health habits.    Healthy Habits:     Getting at least 3 servings of Calcium per day:  Yes    Bi-annual eye exam:  Yes    Dental care twice a year:  Yes    Sleep apnea or symptoms of sleep apnea:  None    Diet:  Regular (no restrictions)    Frequency of exercise:  2-3 days/week    Duration of exercise:  30-45 minutes    Taking medications regularly:  No    Barriers to taking medications:  None    Medication side effects:  None    PHQ-2 Total Score: 0    Additional concerns today:  No    Today's PHQ-2 Score:   PHQ-2 ( 1999 [employer]) 9/28/2021   Q1: Little interest or pleasure in doing things 0   Q2: Feeling down, depressed or hopeless 0   PHQ-2 Score 0   Q1: Little interest or pleasure in doing things Not at all   Q2: Feeling down, depressed or hopeless Not at all   PHQ-2 Score 0       Abuse: Current or Past(Physical, Sexual or Emotional)- No  Do you feel safe in your environment? Yes        Social History     Tobacco Use     Smoking status: Never Smoker     Smokeless tobacco: Never Used   Substance Use Topics     Alcohol use: Yes     Alcohol/week: 3.0 standard drinks     If you drink alcohol do you typically have >3 drinks per day or >7 drinks per week? No    Alcohol Use  "9/28/2021   Prescreen: >3 drinks/day or >7 drinks/week? No       Last PSA:   Prostate Specific Antigen Screen   Date Value Ref Range Status   02/06/2017 0.7 0.0 - 3.5 ng/mL Final       Reviewed orders with patient. Reviewed health maintenance and updated orders accordingly - Yes  Lab work is in process    Reviewed and updated as needed this visit by clinical staff  Tobacco  Allergies  Meds              Reviewed and updated as needed this visit by Provider                History reviewed. No pertinent past medical history.   Past Surgical History:   Procedure Laterality Date     FOOT SURGERY Right     Bone spur removal.     SKIN LESION EXCISION      not cancer       Review of Systems  CONSTITUTIONAL: NEGATIVE for fever, chills, change in weight  INTEGUMENTARY/SKIN: NEGATIVE for worrisome rashes, moles or lesions  EYES: NEGATIVE for vision changes or irritation  ENT: NEGATIVE for ear, mouth and throat problems  RESP: NEGATIVE for significant cough or SOB  CV: NEGATIVE for chest pain, palpitations or peripheral edema  GI: NEGATIVE for nausea, abdominal pain, heartburn, or change in bowel habits   male: negative for dysuria, hematuria, decreased urinary stream, erectile dysfunction, urethral discharge  MUSCULOSKELETAL: NEGATIVE for significant arthralgias or myalgia  NEURO: NEGATIVE for weakness, dizziness or paresthesias  PSYCHIATRIC: NEGATIVE for changes in mood or affect    OBJECTIVE:   /80   Pulse 57   Ht 1.848 m (6' 0.75\")   Wt 88.4 kg (194 lb 12.8 oz)   SpO2 98%   BMI 25.88 kg/m      Physical Exam  GENERAL: healthy, alert and no distress  EYES: Eyes grossly normal to inspection, PERRL and conjunctivae and sclerae normal  HENT: ear canals and TM's normal, nose and mouth without ulcers or lesions  NECK: no adenopathy, no asymmetry, masses, or scars and thyroid normal to palpation  RESP: lungs clear to auscultation - no rales, rhonchi or wheezes  CV: regular rate and rhythm, normal S1 S2, no S3 or S4, " "no murmur, click or rub, no peripheral edema and peripheral pulses strong  ABDOMEN: soft, nontender, no hepatosplenomegaly, no masses and bowel sounds normal  MS: no gross musculoskeletal defects noted, no edema  SKIN: no suspicious lesions or rashes  NEURO: Normal strength and tone, mentation intact and speech normal  PSYCH: mentation appears normal, affect normal/bright    Diagnostic Test Results:  Labs reviewed in Epic    ASSESSMENT/PLAN:   (Z00.00) Physical exam  (primary encounter diagnosis)  Comment: Overall the patient has very good health habits.  Plan:      (I10) Hypertension  Comment: Well controlled  Plan: lisinopril (ZESTRIL) 10 MG tablet             (K22.2) Esophageal stricture  Comment: Patient is on chronic Protonix  Plan: pantoprazole (PROTONIX) 40 MG EC tablet             (E78.49) Hyperlipidemia  Comment: Elevated though a prior 0 on a coronary calcium score  Plan: Comprehensive metabolic panel, Lipid panel         reflex to direct LDL Fasting             (B00.9) Herpes simplex  Comment: Valtrex as needed  Plan: valACYclovir (VALTREX) 500 MG tablet             (Z23) Need for vaccination  Comment:    Plan: INFLUENZA QUAD, PF (RIV4) (FLUBLOK)             PLAN:  1.  Influenza vaccine.  2.  Laboratory studies as above.  3.  Otherwise maintain the patient on his same medications, he will continue his good habits and should be seen yearly.    Patient has been advised of split billing requirements and indicates understanding: Yes  COUNSELING:   Reviewed preventive health counseling, as reflected in patient instructions       Regular exercise       Healthy diet/nutrition    Estimated body mass index is 25.88 kg/m  as calculated from the following:    Height as of this encounter: 1.848 m (6' 0.75\").    Weight as of this encounter: 88.4 kg (194 lb 12.8 oz).     Weight management plan: Discussed healthy diet and exercise guidelines    He reports that he has never smoked. He has never used smokeless " tobacco.      Counseling Resources:  ATP IV Guidelines  Pooled Cohorts Equation Calculator  FRAX Risk Assessment  ICSI Preventive Guidelines  Dietary Guidelines for Americans, 2010  USDA's MyPlate  ASA Prophylaxis  Lung CA Screening    Davy Houston MD  [facility]

## 2021-09-28 NOTE — LETTER
September 28, 2021      Jhoan Renteria  [address] [address]        Dear ,    We are writing to inform you of your test results.  Liver and kidney tests are normal.  Cholesterol is very well controlled.      Resulted Orders   Comprehensive metabolic panel   Result Value Ref Range    Sodium 143 136 - 145 mmol/L    Potassium 4.9 3.5 - 5.0 mmol/L    Chloride 105 98 - 107 mmol/L    Carbon Dioxide (CO2) 29 22 - 31 mmol/L    Anion Gap 9 5 - 18 mmol/L    Urea Nitrogen 15 8 - 22 mg/dL    Creatinine 0.96 0.70 - 1.30 mg/dL    Calcium 10.3 8.5 - 10.5 mg/dL    Glucose 101 70 - 125 mg/dL    Alkaline Phosphatase 44 (L) 45 - 120 U/L    AST 26 0 - 40 U/L    ALT 21 0 - 45 U/L    Protein Total 7.4 6.0 - 8.0 g/dL    Albumin 4.6 3.5 - 5.0 g/dL    Bilirubin Total 1.0 0.0 - 1.0 mg/dL    GFR Estimate 87 >60 mL/min/1.73m2      Comment:      As of July 11, 2021, eGFR is calculated by the CKD-EPI creatinine equation, without race adjustment. eGFR can be influenced by muscle mass, exercise, and diet. The reported eGFR is an estimation only and is only applicable if the renal function is stable.   Lipid panel reflex to direct LDL Fasting   Result Value Ref Range    Cholesterol 193 <=199 mg/dL    Triglycerides 99 <=149 mg/dL    Direct Measure HDL 59 >=40 mg/dL      Comment:      HDL Cholesterol Reference Range:     0-2 years:   No reference ranges established for patients under 2 years old  at [facility] for lipid analytes.    2-8 years:  Greater than 45 mg/dL     18 years and older:   Female: Greater than or equal to 50 mg/dL   Male:   Greater than or equal to 40 mg/dL    LDL Cholesterol Calculated 114 <=129 mg/dL    Patient Fasting > 8hrs? Yes        If you have any questions or concerns, please call the clinic at the number listed above.       Sincerely,      Davy Houston MD

## 2021-10-21 DIAGNOSIS — B00.9 HERPES SIMPLEX: ICD-10-CM

## 2021-10-22 RX ORDER — VALACYCLOVIR HYDROCHLORIDE 500 MG/1
TABLET, FILM COATED ORAL
Qty: 30 TABLET | Refills: 1 | OUTPATIENT
Start: 2021-10-22

## 2021-11-03 ENCOUNTER — ALLIED HEALTH/NURSE VISIT (OUTPATIENT)
Dept: FAMILY MEDICINE | Facility: CLINIC | Age: 56
End: 2021-11-03
Payer: COMMERCIAL

## 2021-11-03 ENCOUNTER — TELEPHONE (OUTPATIENT)
Dept: FAMILY MEDICINE | Facility: CLINIC | Age: 56
End: 2021-11-03

## 2021-11-03 DIAGNOSIS — Z23 NEED FOR SHINGLES VACCINE: Primary | ICD-10-CM

## 2021-11-03 DIAGNOSIS — Z23 NEED FOR VACCINATION: Primary | ICD-10-CM

## 2021-11-03 PROCEDURE — 90750 HZV VACC RECOMBINANT IM: CPT

## 2021-11-03 PROCEDURE — 99207 PR NO CHARGE NURSE ONLY: CPT

## 2021-11-03 PROCEDURE — 90471 IMMUNIZATION ADMIN: CPT

## 2021-12-29 ENCOUNTER — IMMUNIZATION (OUTPATIENT)
Dept: NURSING | Facility: CLINIC | Age: 56
End: 2021-12-29
Payer: COMMERCIAL

## 2021-12-29 PROCEDURE — 91300 PR COVID VAC PFIZER DIL RECON 30 MCG/0.3 ML IM: CPT

## 2021-12-29 PROCEDURE — 0004A PR COVID VAC PFIZER DIL RECON 30 MCG/0.3 ML IM: CPT

## 2022-03-09 ENCOUNTER — ALLIED HEALTH/NURSE VISIT (OUTPATIENT)
Dept: FAMILY MEDICINE | Facility: CLINIC | Age: 57
End: 2022-03-09
Payer: COMMERCIAL

## 2022-03-09 DIAGNOSIS — Z23 NEED FOR SHINGLES VACCINE: ICD-10-CM

## 2022-03-09 DIAGNOSIS — Z23 NEED FOR SHINGLES VACCINE: Primary | ICD-10-CM

## 2022-03-09 PROCEDURE — 90471 IMMUNIZATION ADMIN: CPT

## 2022-03-09 PROCEDURE — 90750 HZV VACC RECOMBINANT IM: CPT

## 2022-03-09 PROCEDURE — 99207 PR NO CHARGE NURSE ONLY: CPT

## 2022-03-18 ENCOUNTER — HOSPITAL ENCOUNTER (OUTPATIENT)
Dept: RADIOLOGY | Facility: CLINIC | Age: 57
Discharge: HOME | End: 2022-03-18
Attending: PHYSICAL MEDICINE & REHABILITATION | Admitting: PHYSICAL MEDICINE & REHABILITATION
Payer: COMMERCIAL

## 2022-03-18 ENCOUNTER — OFFICE VISIT (OUTPATIENT)
Dept: PHYSICAL MEDICINE AND REHAB | Facility: CLINIC | Age: 57
End: 2022-03-18
Payer: COMMERCIAL

## 2022-03-18 VITALS
HEART RATE: 69 BPM | SYSTOLIC BLOOD PRESSURE: 146 MMHG | BODY MASS INDEX: 25.9 KG/M2 | WEIGHT: 195 LBS | DIASTOLIC BLOOD PRESSURE: 75 MMHG

## 2022-03-18 DIAGNOSIS — M79.18 GLUTEAL PAIN: ICD-10-CM

## 2022-03-18 DIAGNOSIS — M54.50 LUMBAR SPINE PAIN: Primary | ICD-10-CM

## 2022-03-18 DIAGNOSIS — M54.50 LUMBAR SPINE PAIN: ICD-10-CM

## 2022-03-18 PROCEDURE — 99204 OFFICE O/P NEW MOD 45 MIN: CPT | Performed by: PHYSICAL MEDICINE & REHABILITATION

## 2022-03-18 PROCEDURE — 72110 X-RAY EXAM L-2 SPINE 4/>VWS: CPT

## 2022-03-18 RX ORDER — METHOCARBAMOL 500 MG/1
500 TABLET, FILM COATED ORAL 2 TIMES DAILY PRN
Qty: 30 TABLET | Refills: 1 | Status: SHIPPED | OUTPATIENT
Start: 2022-03-18 | End: 2022-09-14

## 2022-03-18 RX ORDER — MELOXICAM 15 MG/1
7.5-15 TABLET ORAL DAILY PRN
Qty: 30 TABLET | Refills: 1 | Status: SHIPPED | OUTPATIENT
Start: 2022-03-18 | End: 2022-09-14

## 2022-03-18 ASSESSMENT — PAIN SCALES - GENERAL: PAINLEVEL: MILD PAIN (2)

## 2022-03-18 NOTE — LETTER
3/18/2022         RE: Jhoan Renteria  [address] [facility] [id number]        Dear Colleague,    Thank you for referring your patient, Jhoan Renteria, to the [facility] [place]. Please see a copy of my visit note below.    Assessment/Plan:      Jhoan was seen today for back pain.    Diagnoses and all orders for this visit:    Lumbar spine pain  -     XR Lumbar Spine G/E 4 Views; Future  -     Physical Therapy Referral; Future  -     meloxicam (MOBIC) 15 MG tablet; Take 0.5-1 tablets (7.5-15 mg) by mouth daily as needed for pain  -     methocarbamol (ROBAXIN) 500 MG tablet; Take 1 tablet (500 mg) by mouth 2 times daily as needed for muscle spasms    Gluteal pain  -     XR Lumbar Spine G/E 4 Views; Future  -     Physical Therapy Referral; Future  -     meloxicam (MOBIC) 15 MG tablet; Take 0.5-1 tablets (7.5-15 mg) by mouth daily as needed for pain  -     methocarbamol (ROBAXIN) 500 MG tablet; Take 1 tablet (500 mg) by mouth 2 times daily as needed for muscle spasms         Assessment: Pleasant 56 year old male with a history of hypertension and GERD with:    1.  2-year history of waxing waning left lumbar spine and gluteal pain at the PSIS and gluteal region.  Worsened over the past 3 weeks has become quite severe despite NSAIDs and activity modifications at home.  Consistent with facet arthropathy versus SI pain and myofascial pain.      2.  Mildly reduced left hip range of motion which may contribute.    Discussion:    1.  We discussed the diagnosis and treatment options.  Discussed the options ofImaging therapy medications.    2.  Plain films lumbar spine AP lateral flexion-extension to evaluate the extent of osteoarthritis and for any instability.    3.  Start physical therapy for lumbar core strengthening stabilization SI stabilization.    4.  Trial meloxicam 7.5-15 mg daily as needed for pain.    5.  Trial methocarbamol 500 mg twice a day as needed for myofascial  pain.    6.  We will be in contact with him once plain films have been performed with any further recommendations.    7.  Follow-up 1 month.      It was our pleasure caring for your patient today, if there any questions or concerns please do not hesitate to contact us.      Subjective:   Patient ID: Jhoan Renteria is a 56 year old male.    History of Present Illness: Patient presents for an evaluation of left-sided low back pain.  Presents as a self-referral wife has been seen here in the past per his report.  For the past 2 years he has had intermittent left-sided low back pain at the PSIS radiating to the left gluteal region at times.  This intermittently flares with activities such as putting his shoes on or bending and usually only last a couple of days with activity modifications.  3 weeks ago was putting on his hiking boots and stood and ended up having significant back pain over left PSIS gluteal region at times worse with transition from bending to standing or sitting to lying down.  Became quite severe and relatively constant but waxing waning intensity.  No radiation past the gluteal region down the leg or paresthesias.  Better with time and is taking Aleve without much benefit.  He rates his pain a 7/10 at worst and a 3/10 today.    Imaging: None available       Review of Systems: Denies radiation down leg paresthesias or weakness.  He has some joint pain and muscle pain.  Denies fevers, weight loss, waking, headache, change in vision, chest pain, shortness of breath, abdominal pain, nausea vomiting, bowel or bladder incontinence, skin issues.  No falls. Remainder of 12 point review systems negative unless listed above.    Past Medical History:   Diagnosis Date     GERD (gastroesophageal reflux disease)      Hypertension        Family History   Problem Relation Age of Onset     Hypertension Mother      Ovarian Cancer Mother      Atrial fibrillation Mother         Ablation     Hyperlipidemia Brother       Hypertension Brother      Arthritis Brother      Hypertension Brother      Hyperlipidemia Brother      Hypertension Brother      Hyperlipidemia Brother      Cancer Maternal Grandmother      Heart Disease Maternal Grandfather      Dementia Paternal Grandmother      Cancer Paternal Grandmother      Parkinsonism Paternal Grandmother      Cerebrovascular Disease Paternal Grandfather 52     Coronary Artery Disease Paternal Grandfather 58     Heart Disease Paternal Uncle      Cerebrovascular Disease Paternal Uncle      Prostate Cancer Paternal Uncle          Social History     Socioeconomic History     Marital status:      Spouse name: None     Number of children: 4     Years of education: None     Highest education level: None   Occupational History     Occupation: Computer software   Tobacco Use     Smoking status: Never Smoker     Smokeless tobacco: Never Used   Substance and Sexual Activity     Alcohol use: Yes     Alcohol/week: 7.0 standard drinks     Types: 7 Cans of beer per week     Drug use: No     Sexual activity: Yes     Partners: Female   Other Topics Concern     Parent/sibling w/ CABG, MI or angioplasty before 65F 55M? Not Asked   Social History Narrative    Diet- Regular,             Exercise- Walking, weightlifting        Wife, breast CA     Social Determinants of Health     Financial Resource Strain: Not on file   Food Insecurity: Not on file   Transportation Needs: Not on file   Physical Activity: Not on file   Stress: Not on file   Social Connections: Not on file   Intimate Partner Violence: Not on file   Housing Stability: Not on file   Social history  : .  4 grown children.  .  Does drink some alcohol no tobacco.    The following portions of the patient's history were reviewed and updated as appropriate: allergies, current medications, past family history, past medical history, past social history, past surgical history and problem list.    Oswestry (VALENTIN)  Questionnaire    OSWESTRY DISABILITY INDEX 3/18/2022   Count 10   Sum 16   Oswestry Score (%) 32   Some recent data might be hidden       Neck Disability Index:  No flowsheet data found.      WHO 5: 18            Objective:   Physical Exam:    BP (!) 146/75 (BP Location: Right arm, Patient Position: Sitting, Cuff Size: Adult Regular)   Pulse 69   Wt 195 lb (88.5 kg)   BMI 25.90 kg/m    Body mass index is 25.9 kg/m .      General:  Well-appearing male in no acute distress.  Pleasant, cooperative, and interactive throughout the examination and interview.  CV: No lower extremity edema on inspection or paltation.  Lymphatics: No cervical lymphadenopathy palpated. Eyes: sclera clear. Skin: No rashes or lesions seen over the head/neck, hairline, arms, legs  .  Respirations unlabored.  MSK: Gait is normal.  Able to heel-toe walk without difficulty.  Negative Romberg.  Spine: normal AP curves of the C, T, and L spine.  Moderate reduced lumbar flexion finger to floor testing with guarded motion.  Palpation: Tenderness to palpation over left sciatic notch and over the PSIS. Extremities: Full range of motion of the elbows, and wrists with no effusions or tenderness to palpation.   Mild liters range of motion left hip internal and external rotation from seated and supine.  Low back pain on the left with KAYDEN test bilaterally.  Negative thigh thrust on the left.  Full range of motion of the right hip, bilateral knees, and ankles with no effusions or tenderness to palpation.    No hypermobility of the upper or lower extremities.  Neurologic exam: Mental status: Patient is alert and oriented with normal affect.  Attention, knowledge, memory, and language are intact.  Normal coordination throughout the examination.  Reflexes are 2+ and symmetric biceps, triceps, brachioradialis, patellar, and Achilles with down-going toes and Negative Rich's.  Sensation is intact to light touch throughout the upper and lower extremities  bilaterally.  Manual muscle testing reveals 5 out of 5 in the hip flexors, knee flexors/extensors, ankle plantar flexors, ankle  dorsiflexors, and EHL.  Upper extremities: Grossly normal strength . Normal muscle bulk and tone in the arms and legs.    Negative seated and supine straight leg raise bilaterally.  Hypertonic hamstrings  bilaterally.          Again, thank you for allowing me to participate in the care of your patient.        Sincerely,        Abdulkadir Velazquez, DO

## 2022-03-18 NOTE — PATIENT INSTRUCTIONS
1. An xray was ordered for you today.  You will be contacted by scheduling within 3 days.    If you are not contacted, please call Radiology at [phone].       2. A physical therapy order was provided for you today.  You   will be contacted by physical therapy.  If nobody contacts you within 3 to 5 days, please contact the clinic at [phone].  It will be very important for you to do your physical therapy exercises on a regular basis to decrease your pain and prevent future pain flares.    3. meloxicam (which is an anti-inflammatory) medication is prescribed today. Take 1/1- 1 tablet daily as needed for pain. This medication should be taken with food and water to prevent any stomach upset. Do not take ibuprofen/Advil/Motrin/Aleve/naproxen while you take meloxicam. Please call if you have any side effects.    4. Methocarbamol (muscle relaxant medication) has been prescribed today. Please take 1 tab twice daily as needed. This medication may cause drowsiness. Please do not work or drive while taking this medication until you know how it effects you. If it does make you drowsy, you should only take it before bedtime or at times that you do not have to work/drive.

## 2022-03-18 NOTE — PROGRESS NOTES
Assessment/Plan:      Jhoan was seen today for back pain.    Diagnoses and all orders for this visit:    Lumbar spine pain  -     XR Lumbar Spine G/E 4 Views; Future  -     Physical Therapy Referral; Future  -     meloxicam (MOBIC) 15 MG tablet; Take 0.5-1 tablets (7.5-15 mg) by mouth daily as needed for pain  -     methocarbamol (ROBAXIN) 500 MG tablet; Take 1 tablet (500 mg) by mouth 2 times daily as needed for muscle spasms    Gluteal pain  -     XR Lumbar Spine G/E 4 Views; Future  -     Physical Therapy Referral; Future  -     meloxicam (MOBIC) 15 MG tablet; Take 0.5-1 tablets (7.5-15 mg) by mouth daily as needed for pain  -     methocarbamol (ROBAXIN) 500 MG tablet; Take 1 tablet (500 mg) by mouth 2 times daily as needed for muscle spasms         Assessment: Pleasant 56 year old male with a history of hypertension and GERD with:    1.  2-year history of waxing waning left lumbar spine and gluteal pain at the PSIS and gluteal region.  Worsened over the past 3 weeks has become quite severe despite NSAIDs and activity modifications at home.  Consistent with facet arthropathy versus SI pain and myofascial pain.      2.  Mildly reduced left hip range of motion which may contribute.    Discussion:    1.  We discussed the diagnosis and treatment options.  Discussed the options ofImaging therapy medications.    2.  Plain films lumbar spine AP lateral flexion-extension to evaluate the extent of osteoarthritis and for any instability.    3.  Start physical therapy for lumbar core strengthening stabilization SI stabilization.    4.  Trial meloxicam 7.5-15 mg daily as needed for pain.    5.  Trial methocarbamol 500 mg twice a day as needed for myofascial pain.    6.  We will be in contact with him once plain films have been performed with any further recommendations.    7.  Follow-up 1 month.      It was our pleasure caring for your patient today, if there any questions or concerns please do not hesitate to contact  us.      Subjective:   Patient ID: Jhoan Renteria is a 56 year old male.    History of Present Illness: Patient presents for an evaluation of left-sided low back pain.  Presents as a self-referral wife has been seen here in the past per his report.  For the past 2 years he has had intermittent left-sided low back pain at the PSIS radiating to the left gluteal region at times.  This intermittently flares with activities such as putting his shoes on or bending and usually only last a couple of days with activity modifications.  3 weeks ago was putting on his hiking boots and stood and ended up having significant back pain over left PSIS gluteal region at times worse with transition from bending to standing or sitting to lying down.  Became quite severe and relatively constant but waxing waning intensity.  No radiation past the gluteal region down the leg or paresthesias.  Better with time and is taking Aleve without much benefit.  He rates his pain a 7/10 at worst and a 3/10 today.    Imaging: None available       Review of Systems: Denies radiation down leg paresthesias or weakness.  He has some joint pain and muscle pain.  Denies fevers, weight loss, waking, headache, change in vision, chest pain, shortness of breath, abdominal pain, nausea vomiting, bowel or bladder incontinence, skin issues.  No falls. Remainder of 12 point review systems negative unless listed above.    Past Medical History:   Diagnosis Date     GERD (gastroesophageal reflux disease)      Hypertension        Family History   Problem Relation Age of Onset     Hypertension Mother      Ovarian Cancer Mother      Atrial fibrillation Mother         Ablation     Hyperlipidemia Brother      Hypertension Brother      Arthritis Brother      Hypertension Brother      Hyperlipidemia Brother      Hypertension Brother      Hyperlipidemia Brother      Cancer Maternal Grandmother      Heart Disease Maternal Grandfather      Dementia Paternal Grandmother       Cancer Paternal Grandmother      Parkinsonism Paternal Grandmother      Cerebrovascular Disease Paternal Grandfather 52     Coronary Artery Disease Paternal Grandfather 58     Heart Disease Paternal Uncle      Cerebrovascular Disease Paternal Uncle      Prostate Cancer Paternal Uncle          Social History     Socioeconomic History     Marital status:      Spouse name: None     Number of children: 4     Years of education: None     Highest education level: None   Occupational History     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 Not Asked   Social History Narrative    Diet- Regular,             Exercise- Walking, weightlifting        Wife, breast CA     Social Determinants of Health     Financial Resource Strain: Not on file   Food Insecurity: Not on file   Transportation Needs: Not on file   Physical Activity: Not on file   Stress: Not on file   Social Connections: Not on file   Intimate Partner Violence: Not on file   Housing Stability: Not on file   Social history  : .  4 grown children.  .  Does drink some alcohol no tobacco.    The following portions of the patient's history were reviewed and updated as appropriate: allergies, current medications, past family history, past medical history, past social history, past surgical history and problem list.    Oswestry (VALENTIN) Questionnaire    OSWESTRY DISABILITY INDEX 3/18/2022   Count 10   Sum 16   Oswestry Score (%) 32   Some recent data might be hidden       Neck Disability Index:  No flowsheet data found.      WHO 5: 18            Objective:   Physical Exam:    BP (!) 146/75 (BP Location: Right arm,  Patient Position: Sitting, Cuff Size: Adult Regular)   Pulse 69   Wt 195 lb (88.5 kg)   BMI 25.90 kg/m    Body mass index is 25.9 kg/m .      General:  Well-appearing male in no acute distress.  Pleasant, cooperative, and interactive throughout the examination and interview.  CV: No lower extremity edema on inspection or paltation.  Lymphatics: No cervical lymphadenopathy palpated. Eyes: sclera clear. Skin: No rashes or lesions seen over the head/neck, hairline, arms, legs  .  Respirations unlabored.  MSK: Gait is normal.  Able to heel-toe walk without difficulty.  Negative Romberg.  Spine: normal AP curves of the C, T, and L spine.  Moderate reduced lumbar flexion finger to floor testing with guarded motion.  Palpation: Tenderness to palpation over left sciatic notch and over the PSIS. Extremities: Full range of motion of the elbows, and wrists with no effusions or tenderness to palpation.   Mild liters range of motion left hip internal and external rotation from seated and supine.  Low back pain on the left with KAYDEN test bilaterally.  Negative thigh thrust on the left.  Full range of motion of the right hip, bilateral knees, and ankles with no effusions or tenderness to palpation.    No hypermobility of the upper or lower extremities.  Neurologic exam: Mental status: Patient is alert and oriented with normal affect.  Attention, knowledge, memory, and language are intact.  Normal coordination throughout the examination.  Reflexes are 2+ and symmetric biceps, triceps, brachioradialis, patellar, and Achilles with down-going toes and Negative Rich's.  Sensation is intact to light touch throughout the upper and lower extremities bilaterally.  Manual muscle testing reveals 5 out of 5 in the hip flexors, knee flexors/extensors, ankle plantar flexors, ankle  dorsiflexors, and EHL.  Upper extremities: Grossly normal strength . Normal muscle bulk and tone in the arms and legs.    Negative seated and supine straight  leg raise bilaterally.  Hypertonic hamstrings  bilaterally.

## 2022-04-01 ENCOUNTER — HOSPITAL ENCOUNTER (OUTPATIENT)
Dept: PHYSICAL THERAPY | Facility: REHABILITATION | Age: 57
Discharge: HOME | End: 2022-04-01
Attending: PHYSICAL MEDICINE & REHABILITATION
Payer: COMMERCIAL

## 2022-04-01 DIAGNOSIS — M79.18 GLUTEAL PAIN: ICD-10-CM

## 2022-04-01 DIAGNOSIS — M54.50 LUMBAR SPINE PAIN: ICD-10-CM

## 2022-04-01 DIAGNOSIS — M46.1 SACROILIITIS (H): Primary | ICD-10-CM

## 2022-04-01 PROCEDURE — 97110 THERAPEUTIC EXERCISES: CPT | Mod: GP

## 2022-04-01 PROCEDURE — 97140 MANUAL THERAPY 1/> REGIONS: CPT | Mod: GP

## 2022-04-01 PROCEDURE — 97161 PT EVAL LOW COMPLEX 20 MIN: CPT | Mod: GP

## 2022-04-08 ENCOUNTER — HOSPITAL ENCOUNTER (OUTPATIENT)
Dept: PHYSICAL THERAPY | Facility: REHABILITATION | Age: 57
Discharge: HOME | End: 2022-04-08
Attending: PHYSICAL MEDICINE & REHABILITATION
Payer: COMMERCIAL

## 2022-04-08 DIAGNOSIS — M79.18 GLUTEAL PAIN: ICD-10-CM

## 2022-04-08 DIAGNOSIS — M46.1 SACROILIITIS (H): ICD-10-CM

## 2022-04-08 DIAGNOSIS — M54.50 LUMBAR SPINE PAIN: Primary | ICD-10-CM

## 2022-04-08 PROCEDURE — 97110 THERAPEUTIC EXERCISES: CPT | Mod: GP

## 2022-04-13 DIAGNOSIS — B00.9 HERPES SIMPLEX: ICD-10-CM

## 2022-04-15 ENCOUNTER — HOSPITAL ENCOUNTER (OUTPATIENT)
Dept: PHYSICAL THERAPY | Facility: REHABILITATION | Age: 57
Discharge: HOME | End: 2022-04-15
Attending: PHYSICAL MEDICINE & REHABILITATION
Payer: COMMERCIAL

## 2022-04-15 DIAGNOSIS — M46.1 SACROILIITIS (H): ICD-10-CM

## 2022-04-15 DIAGNOSIS — M54.50 LUMBAR SPINE PAIN: Primary | ICD-10-CM

## 2022-04-15 DIAGNOSIS — M79.18 GLUTEAL PAIN: ICD-10-CM

## 2022-04-15 PROCEDURE — 97110 THERAPEUTIC EXERCISES: CPT | Mod: GP

## 2022-04-15 RX ORDER — VALACYCLOVIR HYDROCHLORIDE 500 MG/1
TABLET, FILM COATED ORAL
Qty: 30 TABLET | Refills: 1 | Status: SHIPPED | OUTPATIENT
Start: 2022-04-15 | End: 2022-05-13

## 2022-04-15 NOTE — TELEPHONE ENCOUNTER
"Last Written Prescription Date:  9/28/21  Last Fill Quantity: 30,  # refills: 1   Last office visit provider:  9/28/21     Requested Prescriptions   Pending Prescriptions Disp Refills     valACYclovir (VALTREX) 500 MG tablet [Pharmacy Med Name: VALACYCLOVIR  MG TABLET] 30 tablet 1     Sig: [VALACYCLOVIR (VALTREX) 500 MG TABLET] TAKE ONE TWICE DAILY AS NEEDED FOR OUTBREAKS       Antivirals for Herpes Protocol Passed - 4/15/2022 11:26 AM        Passed - Patient is age 12 or older        Passed - Recent (12 mo) or future (30 days) visit within the authorizing provider's specialty     Patient has had an office visit with the authorizing provider or a provider within the authorizing providers department within the previous 12 mos or has a future within next 30 days. See \"Patient Info\" tab in inbasket, or \"Choose Columns\" in Meds & Orders section of the refill encounter.              Passed - Medication is active on med list        Passed - Normal serum creatinine on file in past 12 months     Recent Labs   Lab Test 09/28/21  0833   CR 0.96       Ok to refill medication if creatinine is low               Bhavin Worrell RN 04/15/22 11:26 AM  "

## 2022-04-22 ENCOUNTER — HOSPITAL ENCOUNTER (OUTPATIENT)
Dept: PHYSICAL THERAPY | Facility: REHABILITATION | Age: 57
Discharge: HOME | End: 2022-04-22
Payer: COMMERCIAL

## 2022-04-22 DIAGNOSIS — M79.18 GLUTEAL PAIN: ICD-10-CM

## 2022-04-22 DIAGNOSIS — M54.50 LUMBAR SPINE PAIN: Primary | ICD-10-CM

## 2022-04-22 DIAGNOSIS — M46.1 SACROILIITIS (H): ICD-10-CM

## 2022-04-22 PROCEDURE — 97110 THERAPEUTIC EXERCISES: CPT | Mod: GP

## 2022-05-11 DIAGNOSIS — B00.9 HERPES SIMPLEX: ICD-10-CM

## 2022-05-13 ENCOUNTER — HOSPITAL ENCOUNTER (OUTPATIENT)
Dept: PHYSICAL THERAPY | Facility: REHABILITATION | Age: 57
Discharge: HOME | End: 2022-05-13
Payer: COMMERCIAL

## 2022-05-13 DIAGNOSIS — M79.18 GLUTEAL PAIN: ICD-10-CM

## 2022-05-13 DIAGNOSIS — M54.50 LUMBAR SPINE PAIN: Primary | ICD-10-CM

## 2022-05-13 DIAGNOSIS — M46.1 SACROILIITIS (H): ICD-10-CM

## 2022-05-13 PROCEDURE — 97110 THERAPEUTIC EXERCISES: CPT | Mod: GP

## 2022-05-13 RX ORDER — VALACYCLOVIR HYDROCHLORIDE 500 MG/1
TABLET, FILM COATED ORAL
Qty: 30 TABLET | Refills: 1 | Status: SHIPPED | OUTPATIENT
Start: 2022-05-13 | End: 2022-05-29

## 2022-05-13 NOTE — TELEPHONE ENCOUNTER
"Last Written Prescription Date:  4/15/22  Last Fill Quantity: 30,  # refills: 1   Last office visit provider:  9/28/21     Requested Prescriptions   Pending Prescriptions Disp Refills     valACYclovir (VALTREX) 500 MG tablet [Pharmacy Med Name: VALACYCLOVIR  MG TABLET] 30 tablet 1     Sig: TAKE ONE TWICE DAILY AS NEEDED FOR OUTBREAKS       Antivirals for Herpes Protocol Passed - 5/11/2022  1:37 PM        Passed - Patient is age 12 or older        Passed - Recent (12 mo) or future (30 days) visit within the authorizing provider's specialty     Patient has had an office visit with the authorizing provider or a provider within the authorizing providers department within the previous 12 mos or has a future within next 30 days. See \"Patient Info\" tab in inbasket, or \"Choose Columns\" in Meds & Orders section of the refill encounter.              Passed - Medication is active on med list        Passed - Normal serum creatinine on file in past 12 months     Recent Labs   Lab Test 09/28/21  0833   CR 0.96       Ok to refill medication if creatinine is low               Cait Tapia, RN 05/13/22 3:55 PM  "

## 2022-05-27 DIAGNOSIS — B00.9 HERPES SIMPLEX: ICD-10-CM

## 2022-05-29 RX ORDER — VALACYCLOVIR HYDROCHLORIDE 500 MG/1
TABLET, FILM COATED ORAL
Qty: 90 TABLET | Refills: 0 | Status: SHIPPED | OUTPATIENT
Start: 2022-05-29 | End: 2023-09-07

## 2022-05-29 NOTE — TELEPHONE ENCOUNTER
"Last Written Prescription Date:  5/13/22  Last Fill Quantity: 30,  # refills: 1   Last office visit provider:  9/28/21     Requested Prescriptions   Pending Prescriptions Disp Refills     valACYclovir (VALTREX) 500 MG tablet [Pharmacy Med Name: VALACYCLOVIR  MG TABLET] 180 tablet 1     Sig: TAKE ONE TABLET BY MOUTH TWICE DAILY AS NEEDED FOR OUTBREAKS       Antivirals for Herpes Protocol Passed - 5/27/2022 12:35 PM        Passed - Patient is age 12 or older        Passed - Recent (12 mo) or future (30 days) visit within the authorizing provider's specialty     Patient has had an office visit with the authorizing provider or a provider within the authorizing providers department within the previous 12 mos or has a future within next 30 days. See \"Patient Info\" tab in inbasket, or \"Choose Columns\" in Meds & Orders section of the refill encounter.              Passed - Medication is active on med list        Passed - Normal serum creatinine on file in past 12 months     Recent Labs   Lab Test 09/28/21  0833   CR 0.96       Ok to refill medication if creatinine is low               Cait Tapia, RN 05/29/22 5:55 PM  "

## 2022-06-12 DIAGNOSIS — B00.9 HERPES SIMPLEX: ICD-10-CM

## 2022-06-13 RX ORDER — VALACYCLOVIR HYDROCHLORIDE 500 MG/1
TABLET, FILM COATED ORAL
Qty: 180 TABLET | Refills: 1 | OUTPATIENT
Start: 2022-06-13

## 2022-06-13 NOTE — TELEPHONE ENCOUNTER
Medication is prescribed as PRN for outbreaks only.  Any questions/concerns should be communicated to clinic staff.

## 2022-07-21 NOTE — ADDENDUM NOTE
Encounter addended by: Zoran Goldberg, PT on: 7/21/2022 7:42 AM   Actions taken: Clinical Note Signed, Episode resolved

## 2022-07-21 NOTE — PROGRESS NOTES
"[facility]    Outpatient Physical Therapy Discharge Note  Patient: Jhoan Renteria  : 1965    Beginning/End Dates of Reporting Period:  22 to 22    Referring Provider: Dr. Abdulkadir Velazquez, DO    Therapy Diagnosis: Left posterolateral hip and chronic low back pain with movement coordination impairments     Client Self Report: Jhoan said that he has no back pain, and very minimal and rare left hip pain. He reports being at a \"9/10\" in return to pain- and limitation-free function.    Objective Measurements:  Objective Measure: Lumbar R rotation  Details: 50% to 75% (pain)  Objective Measure: Current pain  Details: 1/10  Objective Measure: Core Strength  Details: Unable to perform 1 curl-up with proper form and without compensation    Outcome Measures (most recent score):  VALENTIN: 0%    Goals:  Goal Identifier VALENTIN   Goal Description Jhoan will demonstrate improved functional independence as evidencec by a decrease in his VALENTIN score to less than 10%.   Target Date 22   Date Met  22   Progress (detail required for progress note): 0%     Goal Identifier Don/doff Shoes   Goal Description Jhoan will be able to don/doff and tie shoes with self-report pain 0-2/10.   Target Date 22   Date Met  22   Progress (detail required for progress note): Met     Goal Identifier Prepare Food   Goal Description Jhoan will be able to prepare an entire meal (at least 45 minutes), including chopping food, without increase in symptoms.   Target Date 22   Date Met  22   Progress (detail required for progress note): Met         Plan:  Discharge from therapy.    Discharge:    Reason for Discharge: Patient has met all goals.    Equipment Issued: NA    Discharge Plan: Patient to continue home program.  "

## 2022-08-18 ENCOUNTER — IMMUNIZATION (OUTPATIENT)
Dept: NURSING | Facility: CLINIC | Age: 57
End: 2022-08-18
Payer: COMMERCIAL

## 2022-08-18 PROCEDURE — 0054A COVID-19,PF,PFIZER (12+ YRS): CPT

## 2022-08-18 PROCEDURE — 91305 COVID-19,PF,PFIZER (12+ YRS): CPT

## 2022-09-12 DIAGNOSIS — K22.2 ESOPHAGEAL STRICTURE: ICD-10-CM

## 2022-09-12 RX ORDER — PANTOPRAZOLE SODIUM 40 MG/1
TABLET, DELAYED RELEASE ORAL
Qty: 90 TABLET | Refills: 0 | Status: SHIPPED | OUTPATIENT
Start: 2022-09-12 | End: 2022-09-14

## 2022-09-12 NOTE — TELEPHONE ENCOUNTER
"Last Written Prescription Date:  9/28/21  Last Fill Quantity: 90,  # refills: 3   Last office visit provider:  9/28/21     Requested Prescriptions   Pending Prescriptions Disp Refills     pantoprazole (PROTONIX) 40 MG EC tablet [Pharmacy Med Name: PANTOPRAZOLE SOD DR 40 MG TAB] 90 tablet 3     Sig: TAKE 1 TABLET BY MOUTH EVERY DAY       PPI Protocol Passed - 9/12/2022  9:09 AM        Passed - Not on Clopidogrel (unless Pantoprazole ordered)        Passed - No diagnosis of osteoporosis on record        Passed - Recent (12 mo) or future (30 days) visit within the authorizing provider's specialty     Patient has had an office visit with the authorizing provider or a provider within the authorizing providers department within the previous 12 mos or has a future within next 30 days. See \"Patient Info\" tab in inbasket, or \"Choose Columns\" in Meds & Orders section of the refill encounter.              Passed - Medication is active on med list        Passed - Patient is age 18 or older             Sury Colon RN 09/12/22 4:14 PM  "

## 2022-09-14 ENCOUNTER — OFFICE VISIT (OUTPATIENT)
Dept: FAMILY MEDICINE | Facility: CLINIC | Age: 57
End: 2022-09-14
Payer: COMMERCIAL

## 2022-09-14 VITALS
SYSTOLIC BLOOD PRESSURE: 154 MMHG | BODY MASS INDEX: 24.92 KG/M2 | HEIGHT: 72 IN | OXYGEN SATURATION: 98 % | HEART RATE: 81 BPM | WEIGHT: 184 LBS | DIASTOLIC BLOOD PRESSURE: 89 MMHG

## 2022-09-14 DIAGNOSIS — I10 HYPERTENSION, UNSPECIFIED TYPE: ICD-10-CM

## 2022-09-14 DIAGNOSIS — Z00.00 PHYSICAL EXAM: Primary | ICD-10-CM

## 2022-09-14 DIAGNOSIS — Z23 NEED FOR VACCINATION: ICD-10-CM

## 2022-09-14 DIAGNOSIS — K22.2 ESOPHAGEAL STRICTURE: ICD-10-CM

## 2022-09-14 DIAGNOSIS — Z11.59 NEED FOR HEPATITIS C SCREENING TEST: ICD-10-CM

## 2022-09-14 DIAGNOSIS — E78.49 OTHER HYPERLIPIDEMIA: ICD-10-CM

## 2022-09-14 DIAGNOSIS — Z12.5 SCREENING FOR PROSTATE CANCER: ICD-10-CM

## 2022-09-14 DIAGNOSIS — Z11.4 SCREENING FOR HIV (HUMAN IMMUNODEFICIENCY VIRUS): ICD-10-CM

## 2022-09-14 PROCEDURE — 99396 PREV VISIT EST AGE 40-64: CPT | Mod: 25 | Performed by: FAMILY MEDICINE

## 2022-09-14 PROCEDURE — 99214 OFFICE O/P EST MOD 30 MIN: CPT | Mod: 25 | Performed by: FAMILY MEDICINE

## 2022-09-14 PROCEDURE — 90682 RIV4 VACC RECOMBINANT DNA IM: CPT | Performed by: FAMILY MEDICINE

## 2022-09-14 PROCEDURE — 90471 IMMUNIZATION ADMIN: CPT | Performed by: FAMILY MEDICINE

## 2022-09-14 RX ORDER — LISINOPRIL 10 MG/1
10 TABLET ORAL DAILY
Qty: 90 TABLET | Refills: 3 | Status: SHIPPED | OUTPATIENT
Start: 2022-09-14 | End: 2022-09-21 | Stop reason: DRUGHIGH

## 2022-09-14 RX ORDER — PANTOPRAZOLE SODIUM 40 MG/1
40 TABLET, DELAYED RELEASE ORAL DAILY
Qty: 90 TABLET | Refills: 0 | Status: SHIPPED | OUTPATIENT
Start: 2022-09-14 | End: 2022-12-11

## 2022-09-14 ASSESSMENT — ENCOUNTER SYMPTOMS
ABDOMINAL PAIN: 0
HEMATURIA: 0
DIZZINESS: 0
HEARTBURN: 0
PALPITATIONS: 0
DIARRHEA: 0
HEADACHES: 0
COUGH: 0
SORE THROAT: 0
EYE PAIN: 0
WEAKNESS: 0
DYSURIA: 0
PARESTHESIAS: 0
FEVER: 0
MYALGIAS: 0
SHORTNESS OF BREATH: 0
JOINT SWELLING: 0
CHILLS: 0
ARTHRALGIAS: 0
NAUSEA: 0
CONSTIPATION: 0
HEMATOCHEZIA: 0
NERVOUS/ANXIOUS: 0

## 2022-09-14 NOTE — PROGRESS NOTES
SUBJECTIVE:   CC: Jhoan is an 57 year old who presents for preventative health visit.   Patient has been advised of split billing requirements and indicates understanding: Yes     HPI  Patient comes in for his annual physical examination.  Patient has a history of underlying hypertension on lisinopril, of note he has a tendency to have presumably whitecoat hypertension his blood pressure is a bit elevated here also at the dentist office.  Discussed with the patient that I would like him to check his blood pressure at home regularly to see what values he is getting.    Patient has also history of hyperlipidemia attempting to control this with lifestyle.    Patient does have a history of an esophageal stricture he is on indefinite PPI therapy will continue him on the Protonix.    Patient has noticed some occasional nocturia and increased urinary frequency, explained that this is common I am not concerned about prostate cancer but he would like for his PSA to be checked.    Overall the patient has extremely good health habits.        Healthy Habits:     Getting at least 3 servings of Calcium per day:  Yes    Bi-annual eye exam:  Yes    Dental care twice a year:  Yes    Sleep apnea or symptoms of sleep apnea:  None    Diet:  Regular (no restrictions)    Frequency of exercise:  6-7 days/week    Duration of exercise:  30-45 minutes    Taking medications regularly:  Yes    Medication side effects:  None    PHQ-2 Total Score: 0    Additional concerns today:  No              Today's PHQ-2 Score:   PHQ-2 ( 1999 Pfizer) 9/14/2022   Q1: Little interest or pleasure in doing things 0   Q2: Feeling down, depressed or hopeless 0   PHQ-2 Score 0   PHQ-2 Total Score (12-17 Years)- Positive if 3 or more points; Administer PHQ-A if positive -   Q1: Little interest or pleasure in doing things Not at all   Q2: Feeling down, depressed or hopeless Not at all   PHQ-2 Score 0       Abuse: Current or Past(Physical, Sexual or Emotional)-  No  Do you feel safe in your environment? Yes        Social History     Tobacco Use     Smoking status: Never Smoker     Smokeless tobacco: Never Used   Substance Use Topics     Alcohol use: Yes     Alcohol/week: 7.0 standard drinks     Types: 7 Cans of beer per week     If you drink alcohol do you typically have >3 drinks per day or >7 drinks per week? No    Alcohol Use 9/14/2022   Prescreen: >3 drinks/day or >7 drinks/week? No   Prescreen: >3 drinks/day or >7 drinks/week? -       Last PSA:   Prostate Specific Antigen Screen   Date Value Ref Range Status   02/06/2017 0.7 0.0 - 3.5 ng/mL Final       Reviewed orders with patient. Reviewed health maintenance and updated orders accordingly - Yes  Lab work is in process    Reviewed and updated as needed this visit by clinical staff   Tobacco  Allergies                 Reviewed and updated as needed this visit by Provider                   History reviewed. No pertinent past medical history.   Past Surgical History:   Procedure Laterality Date     FOOT SURGERY Right     Bone spur removal.     SKIN LESION EXCISION      not cancer       Review of Systems   Constitutional: Negative for chills and fever.   HENT: Negative for congestion, ear pain, hearing loss and sore throat.    Eyes: Negative for pain and visual disturbance.   Respiratory: Negative for cough and shortness of breath.    Cardiovascular: Negative for chest pain, palpitations and peripheral edema.   Gastrointestinal: Negative for abdominal pain, constipation, diarrhea, heartburn, hematochezia and nausea.   Genitourinary: Positive for impotence. Negative for dysuria, genital sores, hematuria and penile discharge.   Musculoskeletal: Negative for arthralgias, joint swelling and myalgias.   Skin: Negative for rash.   Neurological: Negative for dizziness, weakness, headaches and paresthesias.   Psychiatric/Behavioral: Negative for mood changes. The patient is not nervous/anxious.      CONSTITUTIONAL: NEGATIVE for  fever, chills, change in weight  INTEGUMENTARY/SKIN: NEGATIVE for worrisome rashes, moles or lesions  EYES: NEGATIVE for vision changes or irritation  ENT: NEGATIVE for ear, mouth and throat problems  RESP: NEGATIVE for significant cough or SOB  CV: NEGATIVE for chest pain, palpitations or peripheral edema  GI: NEGATIVE for nausea, abdominal pain, heartburn, or change in bowel habits   male: negative for dysuria, hematuria, decreased urinary stream, erectile dysfunction, urethral discharge  MUSCULOSKELETAL: NEGATIVE for significant arthralgias or myalgia  NEURO: NEGATIVE for weakness, dizziness or paresthesias  PSYCHIATRIC: NEGATIVE for changes in mood or affect    OBJECTIVE:   BP (!) 148/93   Pulse 81   Ht 1.829 m (6')   Wt 83.5 kg (184 lb)   SpO2 98%   BMI 24.95 kg/m      Physical Exam  GENERAL: healthy, alert and no distress  EYES: Eyes grossly normal to inspection, PERRL and conjunctivae and sclerae normal  HENT: ear canals and TM's normal, nose and mouth without ulcers or lesions  NECK: no adenopathy, no asymmetry, masses, or scars and thyroid normal to palpation  RESP: lungs clear to auscultation - no rales, rhonchi or wheezes  CV: regular rate and rhythm, normal S1 S2, no S3 or S4, no murmur, click or rub, no peripheral edema and peripheral pulses strong  ABDOMEN: soft, nontender, no hepatosplenomegaly, no masses and bowel sounds normal  MS: no gross musculoskeletal defects noted, no edema  SKIN: no suspicious lesions or rashes  NEURO: Normal strength and tone, mentation intact and speech normal  PSYCH: mentation appears normal, affect normal/bright    Diagnostic Test Results:  Labs reviewed in Epic    ASSESSMENT/PLAN:   (Z00.00) Physical exam  (primary encounter diagnosis)  Comment: Overall the patient has good health habits.   Plan:      (K22.2) Esophageal stricture  Comment: Patient will be on chronic PPI therapy  Plan: pantoprazole (PROTONIX) 40 MG EC tablet             (E78.49)  Hyperlipidemia  Comment: Elevated attempting to control with lifestyle  Plan: Comprehensive metabolic panel, Lipid panel         reflex to direct LDL Fasting             (I10) Hypertension  Comment: Suboptimal control  Plan: lisinopril (ZESTRIL) 10 MG tablet             (Z12.5) Screening for prostate cancer  Comment: No family history of  Plan: PROSTATE SPEC ANTIGEN SCREEN             (Z23) Need for vaccination  Comment:    Plan: INFLUENZA QUAD, PF (RIV4) (FLUBLOK)             PLAN:  1.  Influenza vaccine.  2.  The patient is going to check his blood pressure now regularly at home to see if he is getting normotensive values.  3.  Future laboratory studies as above  4.  Otherwise continue the patient on the same medications and you should be seen yearly.          COUNSELING:   Reviewed preventive health counseling, as reflected in patient instructions       Regular exercise       Healthy diet/nutrition    Estimated body mass index is 24.95 kg/m  as calculated from the following:    Height as of this encounter: 1.829 m (6').    Weight as of this encounter: 83.5 kg (184 lb).         He reports that he has never smoked. He has never used smokeless tobacco.      Counseling Resources:  ATP IV Guidelines  Pooled Cohorts Equation Calculator  FRAX Risk Assessment  ICSI Preventive Guidelines  Dietary Guidelines for Americans, 2010  [employer]'s MyPlate  ASA Prophylaxis  Lung CA Screening    Davy Houston MD  [facility]

## 2022-09-16 ENCOUNTER — LAB (OUTPATIENT)
Dept: LAB | Facility: CLINIC | Age: 57
End: 2022-09-16
Payer: COMMERCIAL

## 2022-09-16 DIAGNOSIS — E78.49 OTHER HYPERLIPIDEMIA: ICD-10-CM

## 2022-09-16 DIAGNOSIS — Z12.5 SCREENING FOR PROSTATE CANCER: ICD-10-CM

## 2022-09-16 LAB
ALBUMIN SERPL BCG-MCNC: 4.6 G/DL (ref 3.5–5.2)
ALP SERPL-CCNC: 45 U/L (ref 40–129)
ALT SERPL W P-5'-P-CCNC: 23 U/L (ref 10–50)
ANION GAP SERPL CALCULATED.3IONS-SCNC: 11 MMOL/L (ref 7–15)
AST SERPL W P-5'-P-CCNC: 31 U/L (ref 10–50)
BILIRUB SERPL-MCNC: 0.6 MG/DL
BUN SERPL-MCNC: 11.7 MG/DL (ref 6–20)
CALCIUM SERPL-MCNC: 9.6 MG/DL (ref 8.6–10)
CHLORIDE SERPL-SCNC: 101 MMOL/L (ref 98–107)
CHOLEST SERPL-MCNC: 207 MG/DL
CREAT SERPL-MCNC: 0.86 MG/DL (ref 0.67–1.17)
DEPRECATED HCO3 PLAS-SCNC: 27 MMOL/L (ref 22–29)
GFR SERPL CREATININE-BSD FRML MDRD: >90 ML/MIN/1.73M2
GLUCOSE SERPL-MCNC: 98 MG/DL (ref 70–99)
HDLC SERPL-MCNC: 81 MG/DL
LDLC SERPL CALC-MCNC: 112 MG/DL
NONHDLC SERPL-MCNC: 126 MG/DL
POTASSIUM SERPL-SCNC: 4.3 MMOL/L (ref 3.4–5.3)
PROT SERPL-MCNC: 7.3 G/DL (ref 6.4–8.3)
PSA SERPL-MCNC: 1.12 NG/ML (ref 0–3.5)
SODIUM SERPL-SCNC: 139 MMOL/L (ref 136–145)
TRIGL SERPL-MCNC: 70 MG/DL

## 2022-09-16 PROCEDURE — G0103 PSA SCREENING: HCPCS

## 2022-09-16 PROCEDURE — 80061 LIPID PANEL: CPT

## 2022-09-16 PROCEDURE — 36415 COLL VENOUS BLD VENIPUNCTURE: CPT

## 2022-09-16 PROCEDURE — 80053 COMPREHEN METABOLIC PANEL: CPT

## 2022-09-21 DIAGNOSIS — I10 HYPERTENSION, UNSPECIFIED TYPE: Primary | ICD-10-CM

## 2022-09-21 RX ORDER — LISINOPRIL 20 MG/1
20 TABLET ORAL DAILY
Qty: 90 TABLET | Refills: 0 | Status: SHIPPED | OUTPATIENT
Start: 2022-09-21 | End: 2022-12-22

## 2022-12-11 DIAGNOSIS — K22.2 ESOPHAGEAL STRICTURE: ICD-10-CM

## 2022-12-11 RX ORDER — PANTOPRAZOLE SODIUM 40 MG/1
TABLET, DELAYED RELEASE ORAL
Qty: 90 TABLET | Refills: 0 | Status: SHIPPED | OUTPATIENT
Start: 2022-12-11 | End: 2023-05-25

## 2022-12-12 NOTE — TELEPHONE ENCOUNTER
"Last Written Prescription Date:  9/14/2022  Last Fill Quantity: 90,  # refills: 0   Last office visit provider:  9/14/2022     Requested Prescriptions   Pending Prescriptions Disp Refills     pantoprazole (PROTONIX) 40 MG EC tablet [Pharmacy Med Name: PANTOPRAZOLE SOD DR 40 MG TAB] 90 tablet 0     Sig: TAKE 1 TABLET BY MOUTH EVERY DAY       PPI Protocol Passed - 12/11/2022  8:15 AM        Passed - Not on Clopidogrel (unless Pantoprazole ordered)        Passed - No diagnosis of osteoporosis on record        Passed - Recent (12 mo) or future (30 days) visit within the authorizing provider's specialty     Patient has had an office visit with the authorizing provider or a provider within the authorizing providers department within the previous 12 mos or has a future within next 30 days. See \"Patient Info\" tab in inbasket, or \"Choose Columns\" in Meds & Orders section of the refill encounter.              Passed - Medication is active on med list        Passed - Patient is age 18 or older             Erna Morgan RN 12/11/22 11:17 PM  "

## 2023-01-12 ENCOUNTER — IMMUNIZATION (OUTPATIENT)
Dept: NURSING | Facility: CLINIC | Age: 58
End: 2023-01-12
Payer: COMMERCIAL

## 2023-01-12 PROCEDURE — 91312 COVID-19 VACCINE BIVALENT BOOSTER 12+ (PFIZER): CPT

## 2023-01-12 PROCEDURE — 0124A COVID-19 VACCINE BIVALENT BOOSTER 12+ (PFIZER): CPT

## 2023-05-25 DIAGNOSIS — K22.2 ESOPHAGEAL STRICTURE: ICD-10-CM

## 2023-05-25 RX ORDER — PANTOPRAZOLE SODIUM 40 MG/1
TABLET, DELAYED RELEASE ORAL
Qty: 90 TABLET | Refills: 1 | Status: SHIPPED | OUTPATIENT
Start: 2023-05-25 | End: 2023-11-22

## 2023-05-25 NOTE — TELEPHONE ENCOUNTER
"Last Written Prescription Date:  12/11/2022  Last Fill Quantity: 90,  # refills: 0   Last office visit provider:  9/14/2022   Future 9/7/2023  Requested Prescriptions   Pending Prescriptions Disp Refills     pantoprazole (PROTONIX) 40 MG EC tablet [Pharmacy Med Name: PANTOPRAZOLE SOD DR 40 MG TAB] 90 tablet 0     Sig: TAKE 1 TABLET BY MOUTH EVERY DAY       PPI Protocol Passed - 5/25/2023  2:12 PM        Passed - Not on Clopidogrel (unless Pantoprazole ordered)        Passed - No diagnosis of osteoporosis on record        Passed - Recent (12 mo) or future (30 days) visit within the authorizing provider's specialty     Patient has had an office visit with the authorizing provider or a provider within the authorizing providers department within the previous 12 mos or has a future within next 30 days. See \"Patient Info\" tab in inbasket, or \"Choose Columns\" in Meds & Orders section of the refill encounter.              Passed - Medication is active on med list        Passed - Patient is age 18 or older             Carmela Scott RN 05/25/23 2:12 PM  "

## 2023-08-30 ENCOUNTER — TRANSFERRED RECORDS (OUTPATIENT)
Dept: HEALTH INFORMATION MANAGEMENT | Facility: CLINIC | Age: 58
End: 2023-08-30
Payer: COMMERCIAL

## 2023-09-07 ENCOUNTER — OFFICE VISIT (OUTPATIENT)
Dept: FAMILY MEDICINE | Facility: CLINIC | Age: 58
End: 2023-09-07
Payer: COMMERCIAL

## 2023-09-07 VITALS
SYSTOLIC BLOOD PRESSURE: 126 MMHG | OXYGEN SATURATION: 99 % | HEIGHT: 72 IN | RESPIRATION RATE: 19 BRPM | WEIGHT: 190 LBS | HEART RATE: 67 BPM | BODY MASS INDEX: 25.73 KG/M2 | DIASTOLIC BLOOD PRESSURE: 74 MMHG

## 2023-09-07 DIAGNOSIS — K22.2 ESOPHAGEAL STRICTURE: ICD-10-CM

## 2023-09-07 DIAGNOSIS — E78.49 OTHER HYPERLIPIDEMIA: ICD-10-CM

## 2023-09-07 DIAGNOSIS — I10 HYPERTENSION, UNSPECIFIED TYPE: ICD-10-CM

## 2023-09-07 DIAGNOSIS — Z00.00 PHYSICAL EXAM: Primary | ICD-10-CM

## 2023-09-07 DIAGNOSIS — B00.9 HERPES SIMPLEX: ICD-10-CM

## 2023-09-07 DIAGNOSIS — Z12.5 SCREENING FOR PROSTATE CANCER: ICD-10-CM

## 2023-09-07 LAB
ALBUMIN SERPL BCG-MCNC: 4.7 G/DL (ref 3.5–5.2)
ALP SERPL-CCNC: 40 U/L (ref 40–129)
ALT SERPL W P-5'-P-CCNC: 22 U/L (ref 0–70)
ANION GAP SERPL CALCULATED.3IONS-SCNC: 10 MMOL/L (ref 7–15)
AST SERPL W P-5'-P-CCNC: 30 U/L (ref 0–45)
BILIRUB SERPL-MCNC: 0.5 MG/DL
BUN SERPL-MCNC: 13.5 MG/DL (ref 8–23)
CALCIUM SERPL-MCNC: 10 MG/DL (ref 8.6–10)
CHLORIDE SERPL-SCNC: 102 MMOL/L (ref 98–107)
CHOLEST SERPL-MCNC: 212 MG/DL
CREAT SERPL-MCNC: 1.05 MG/DL (ref 0.67–1.17)
DEPRECATED HCO3 PLAS-SCNC: 29 MMOL/L (ref 22–29)
EGFRCR SERPLBLD CKD-EPI 2021: 82 ML/MIN/1.73M2
GLUCOSE SERPL-MCNC: 100 MG/DL (ref 70–99)
HDLC SERPL-MCNC: 72 MG/DL
LDLC SERPL CALC-MCNC: 121 MG/DL
NONHDLC SERPL-MCNC: 140 MG/DL
POTASSIUM SERPL-SCNC: 5.6 MMOL/L (ref 3.4–5.3)
PROT SERPL-MCNC: 7.3 G/DL (ref 6.4–8.3)
PSA SERPL DL<=0.01 NG/ML-MCNC: 1.3 NG/ML (ref 0–3.5)
SODIUM SERPL-SCNC: 141 MMOL/L (ref 136–145)
TRIGL SERPL-MCNC: 93 MG/DL

## 2023-09-07 PROCEDURE — 80061 LIPID PANEL: CPT | Performed by: FAMILY MEDICINE

## 2023-09-07 PROCEDURE — G0103 PSA SCREENING: HCPCS | Performed by: FAMILY MEDICINE

## 2023-09-07 PROCEDURE — 99396 PREV VISIT EST AGE 40-64: CPT | Performed by: FAMILY MEDICINE

## 2023-09-07 PROCEDURE — 80053 COMPREHEN METABOLIC PANEL: CPT | Performed by: FAMILY MEDICINE

## 2023-09-07 PROCEDURE — 99214 OFFICE O/P EST MOD 30 MIN: CPT | Mod: 25 | Performed by: FAMILY MEDICINE

## 2023-09-07 PROCEDURE — 36415 COLL VENOUS BLD VENIPUNCTURE: CPT | Performed by: FAMILY MEDICINE

## 2023-09-07 RX ORDER — LISINOPRIL 20 MG/1
20 TABLET ORAL DAILY
Qty: 90 TABLET | Refills: 3 | Status: SHIPPED | OUTPATIENT
Start: 2023-09-07 | End: 2024-09-09

## 2023-09-07 RX ORDER — VALACYCLOVIR HYDROCHLORIDE 500 MG/1
TABLET, FILM COATED ORAL
Qty: 90 TABLET | Refills: 0 | Status: SHIPPED | OUTPATIENT
Start: 2023-09-07 | End: 2023-10-10

## 2023-09-07 ASSESSMENT — ENCOUNTER SYMPTOMS
HEARTBURN: 0
SORE THROAT: 0
DIARRHEA: 0
HEMATURIA: 0
HEMATOCHEZIA: 0
MYALGIAS: 0
ARTHRALGIAS: 0
HEADACHES: 1
CHILLS: 0
PALPITATIONS: 0
NAUSEA: 0
EYE PAIN: 0
ABDOMINAL PAIN: 0
WEAKNESS: 0
FREQUENCY: 0
DYSURIA: 0
FEVER: 0
NERVOUS/ANXIOUS: 0
COUGH: 0
PARESTHESIAS: 0
SHORTNESS OF BREATH: 0
DIZZINESS: 0
JOINT SWELLING: 0
CONSTIPATION: 0

## 2023-09-07 NOTE — LETTER
September 8, 2023      Jhoan Renteria  [address] [address]        Dear ,    We are writing to inform you of your test results.Cholesterol is just slightly elevated, but focus on good diet and exercise to help control.  Sugar is exactly 100, ideally under 100 but this is not diabetes.  Liver and kidney tests are essentially normal, the slight elevation of potassium is not concerning or significant.  The PSA or prostate test is normal.    See us again in one year.         Resulted Orders   Comprehensive metabolic panel   Result Value Ref Range    Sodium 141 136 - 145 mmol/L    Potassium 5.6 (H) 3.4 - 5.3 mmol/L    Chloride 102 98 - 107 mmol/L    Carbon Dioxide (CO2) 29 22 - 29 mmol/L    Anion Gap 10 7 - 15 mmol/L    Urea Nitrogen 13.5 8.0 - 23.0 mg/dL    Creatinine 1.05 0.67 - 1.17 mg/dL    Calcium 10.0 8.6 - 10.0 mg/dL    Glucose 100 (H) 70 - 99 mg/dL    Alkaline Phosphatase 40 40 - 129 U/L    AST 30 0 - 45 U/L      Comment:      Reference intervals for this test were updated on 6/12/2023 to more accurately reflect our healthy population. There may be differences in the flagging of prior results with similar values performed with this method. Interpretation of those prior results can be made in the context of the updated reference intervals.    ALT 22 0 - 70 U/L      Comment:      Reference intervals for this test were updated on 6/12/2023 to more accurately reflect our healthy population. There may be differences in the flagging of prior results with similar values performed with this method. Interpretation of those prior results can be made in the context of the updated reference intervals.      Protein Total 7.3 6.4 - 8.3 g/dL    Albumin 4.7 3.5 - 5.2 g/dL    Bilirubin Total 0.5 <=1.2 mg/dL    GFR Estimate 82 >60 mL/min/1.73m2   Lipid panel reflex to direct LDL Fasting   Result Value Ref Range    Cholesterol 212 (H) <200 mg/dL    Triglycerides 93 <150 mg/dL    Direct Measure HDL 72  >=40 mg/dL    LDL Cholesterol Calculated 121 (H) <=100 mg/dL    Non HDL Cholesterol 140 (H) <130 mg/dL    Narrative    Cholesterol  Desirable:  <200 mg/dL    Triglycerides  Normal:  Less than 150 mg/dL  Borderline High:  150-199 mg/dL  High:  200-499 mg/dL  Very High:  Greater than or equal to 500 mg/dL    Direct Measure HDL  Female:  Greater than or equal to 50 mg/dL   Male:  Greater than or equal to 40 mg/dL    LDL Cholesterol  Desirable:  <100mg/dL  Above Desirable:  100-129 mg/dL   Borderline High:  130-159 mg/dL   High:  160-189 mg/dL   Very High:  >= 190 mg/dL    Non HDL Cholesterol  Desirable:  130 mg/dL  Above Desirable:  130-159 mg/dL  Borderline High:  160-189 mg/dL  High:  190-219 mg/dL  Very High:  Greater than or equal to 220 mg/dL   PROSTATE SPEC ANTIGEN SCREEN   Result Value Ref Range    Prostate Specific Antigen Screen 1.30 0.00 - 3.50 ng/mL    Narrative    This result is obtained using the Roche Elecsys total PSA method on the blanca [id number] immunoassay analyzer. Results obtained with different assay methods or kits cannot be used interchangeably.       If you have any questions or concerns, please call the clinic at the number listed above.       Sincerely,      Davy Houston MD

## 2023-09-07 NOTE — PROGRESS NOTES
SUBJECTIVE:   CC: Jhoan is an 58 year old who presents for preventative health visit.       9/7/2023     7:33 AM   Additional Questions   Roomed by Ronda   Accompanied by self     HPI:  Patient comes in for his annual physical examination.  Patient has a history of hypertension on lisinopril his initial blood pressure is elevated at 142/93 just took his blood pressure medication 20 minutes before the visit a year ago was also elevated, he admits he has not been checking it regularly at home.  His blood pressure however when rechecked is 126/74 therefore we will continue and renew the lisinopril at the same dose.    Patient has a history of an esophageal stricture of note he has been on a PPI medication though he did not really have a lot of classic heartburn symptoms, discussed that there are potential benefits but also risks of this medication and when I would like the patient to do is to continue with PPI medication but take it every other day rather than daily.    Patient has a history of recurrent cold sores stress seems to be a factor in this.    Overall the patient does have extremely good health habits.    Discussed safe alcohol consumption that is probably 7 drinks at most a week he probably is a bit higher than that otherwise he is generally active physically, though this past winter exercise did fall off talked about ways to stay active over the winter his diet however has improved now that he works from home.        Healthy Habits:     Getting at least 3 servings of Calcium per day:  Yes    Bi-annual eye exam:  Yes    Dental care twice a year:  Yes    Sleep apnea or symptoms of sleep apnea:  None    Diet:  Regular (no restrictions)    Frequency of exercise:  4-5 days/week    Duration of exercise:  30-45 minutes    Taking medications regularly:  Yes    Medication side effects:  None    Additional concerns today:  No      Today's PHQ-2 Score:       9/7/2023     7:31 AM   PHQ-2 ( 1999 Pfizer)   Q1: Little  interest or pleasure in doing things 0   Q2: Feeling down, depressed or hopeless 0   PHQ-2 Score 0   Q1: Little interest or pleasure in doing things Not at all   Q2: Feeling down, depressed or hopeless Not at all   PHQ-2 Score 0                   Have you ever done Advance Care Planning? (For example, a Health Directive, POLST, or a discussion with a medical provider or your loved ones about your wishes): Yes, advance care planning is on file.    Social History     Tobacco Use    Smoking status: Never    Smokeless tobacco: Never   Substance Use Topics    Alcohol use: Yes     Alcohol/week: 7.0 standard drinks of alcohol     Types: 7 Cans of beer per week             9/7/2023     7:31 AM   Alcohol Use   Prescreen: >3 drinks/day or >7 drinks/week? No       Last PSA:   Prostate Specific Antigen Screen   Date Value Ref Range Status   09/16/2022 1.12 0.00 - 3.50 ng/mL Final   02/06/2017 0.7 0.0 - 3.5 ng/mL Final       Reviewed orders with patient. Reviewed health maintenance and updated orders accordingly - Yes  Lab work is in process    Reviewed and updated as needed this visit by clinical staff                  Reviewed and updated as needed this visit by Provider                 History reviewed. No pertinent past medical history.   Past Surgical History:   Procedure Laterality Date    FOOT SURGERY Right     Bone spur removal.    SKIN LESION EXCISION      not cancer       Review of Systems   Constitutional:  Negative for chills and fever.   HENT:  Negative for congestion, ear pain, hearing loss and sore throat.    Eyes:  Negative for pain and visual disturbance.   Respiratory:  Negative for cough and shortness of breath.    Cardiovascular:  Negative for chest pain, palpitations and peripheral edema.   Gastrointestinal:  Negative for abdominal pain, constipation, diarrhea, heartburn, hematochezia and nausea.   Genitourinary:  Positive for impotence. Negative for dysuria, frequency, genital sores, hematuria, penile  discharge and urgency.   Musculoskeletal:  Negative for arthralgias, joint swelling and myalgias.   Skin:  Negative for rash.   Neurological:  Positive for headaches. Negative for dizziness, weakness and paresthesias.   Psychiatric/Behavioral:  Negative for mood changes. The patient is not nervous/anxious.      CONSTITUTIONAL: NEGATIVE for fever, chills, change in weight  INTEGUMENTARY/SKIN: NEGATIVE for worrisome rashes, moles or lesions  EYES: NEGATIVE for vision changes or irritation  ENT: NEGATIVE for ear, mouth and throat problems  RESP: NEGATIVE for significant cough or SOB  CV: NEGATIVE for chest pain, palpitations or peripheral edema  GI: NEGATIVE for nausea, abdominal pain, heartburn, or change in bowel habits   male: negative for dysuria, hematuria, decreased urinary stream, erectile dysfunction, urethral discharge  MUSCULOSKELETAL: NEGATIVE for significant arthralgias or myalgia  NEURO: NEGATIVE for weakness, dizziness or paresthesias  PSYCHIATRIC: NEGATIVE for changes in mood or affect    OBJECTIVE:   There were no vitals taken for this visit.    Physical Exam  GENERAL: healthy, alert and no distress  EYES: Eyes grossly normal to inspection, PERRL and conjunctivae and sclerae normal  HENT: ear canals and TM's normal, nose and mouth without ulcers or lesions  NECK: no adenopathy, no asymmetry, masses, or scars and thyroid normal to palpation  RESP: lungs clear to auscultation - no rales, rhonchi or wheezes  CV: regular rate and rhythm, normal S1 S2, no S3 or S4, no murmur, click or rub, no peripheral edema and peripheral pulses strong  ABDOMEN: soft, nontender, no hepatosplenomegaly, no masses and bowel sounds normal  MS: no gross musculoskeletal defects noted, no edema  SKIN: no suspicious lesions or rashes  NEURO: Normal strength and tone, mentation intact and speech normal  PSYCH: mentation appears normal, affect normal/bright    Diagnostic Test Results:  Labs reviewed in Epic    ASSESSMENT/PLAN:    (Z00.00) Physical exam  (primary encounter diagnosis)  Comment: Overall the patient has good health habits, though suboptimal exercise.  Plan:      (B00.9) Herpes simplex  Comment: Cold sores on occasion Valtrex as needed  Plan: valACYclovir (VALTREX) 500 MG tablet             (K22.2) Esophageal stricture  Comment: Patient with a prior history of an esophageal stricture, he is on PPI medication, asymptomatic  Plan:      (E78.49) Hyperlipidemia  Comment: Elevated, attempting to control with lifestyle  Plan: Comprehensive metabolic panel, Lipid panel         reflex to direct LDL Fasting             (I10) Hypertension  Comment: Well-controlled on lisinopril  Plan: lisinopril (ZESTRIL) 20 MG tablet             (Z12.5) Screening for prostate cancer  Comment: No first-degree relative with  Plan: PROSTATE SPEC ANTIGEN SCREEN             PLAN:  Medications renewed as above without changes  Laboratory studies as above  Patient is going to focus on improved exercise particularly over the winter and consider not trying to consume more than 7 alcoholic drinks weekly  I recommend the patient continue PPI therapy but try to take 1 every other day rather than daily  Patient otherwise should be seen yearly          COUNSELING:   Reviewed preventive health counseling, as reflected in patient instructions       Regular exercise       Healthy diet/nutrition        He reports that he has never smoked. He has never used smokeless tobacco.            Davy Houston MD  [facility]

## 2023-10-10 DIAGNOSIS — B00.9 HERPES SIMPLEX: ICD-10-CM

## 2023-10-10 RX ORDER — VALACYCLOVIR HYDROCHLORIDE 500 MG/1
TABLET, FILM COATED ORAL
Qty: 180 TABLET | Refills: 1 | Status: SHIPPED | OUTPATIENT
Start: 2023-10-10 | End: 2024-09-09

## 2023-11-16 ENCOUNTER — IMMUNIZATION (OUTPATIENT)
Dept: FAMILY MEDICINE | Facility: CLINIC | Age: 58
End: 2023-11-16
Payer: COMMERCIAL

## 2023-11-16 PROCEDURE — 90480 ADMN SARSCOV2 VAC 1/ONLY CMP: CPT

## 2023-11-16 PROCEDURE — 90471 IMMUNIZATION ADMIN: CPT

## 2023-11-16 PROCEDURE — 91320 SARSCV2 VAC 30MCG TRS-SUC IM: CPT

## 2023-11-16 PROCEDURE — 90686 IIV4 VACC NO PRSV 0.5 ML IM: CPT

## 2023-11-22 DIAGNOSIS — K22.2 ESOPHAGEAL STRICTURE: ICD-10-CM

## 2023-11-22 RX ORDER — PANTOPRAZOLE SODIUM 40 MG/1
TABLET, DELAYED RELEASE ORAL
Qty: 90 TABLET | Refills: 1 | Status: SHIPPED | OUTPATIENT
Start: 2023-11-22 | End: 2024-09-09

## 2024-09-09 ENCOUNTER — OFFICE VISIT (OUTPATIENT)
Dept: FAMILY MEDICINE | Facility: CLINIC | Age: 59
End: 2024-09-09
Attending: FAMILY MEDICINE
Payer: COMMERCIAL

## 2024-09-09 VITALS
HEIGHT: 73 IN | HEART RATE: 60 BPM | WEIGHT: 197 LBS | SYSTOLIC BLOOD PRESSURE: 122 MMHG | OXYGEN SATURATION: 98 % | BODY MASS INDEX: 26.11 KG/M2 | DIASTOLIC BLOOD PRESSURE: 86 MMHG

## 2024-09-09 DIAGNOSIS — Z13.21 ENCOUNTER FOR VITAMIN DEFICIENCY SCREENING: ICD-10-CM

## 2024-09-09 DIAGNOSIS — Z12.5 SCREENING FOR PROSTATE CANCER: ICD-10-CM

## 2024-09-09 DIAGNOSIS — N52.9 MALE ERECTILE DISORDER: ICD-10-CM

## 2024-09-09 DIAGNOSIS — K22.2 ESOPHAGEAL STRICTURE: ICD-10-CM

## 2024-09-09 DIAGNOSIS — Z71.84 COUNSELING ABOUT TRAVEL: ICD-10-CM

## 2024-09-09 DIAGNOSIS — E78.49 OTHER HYPERLIPIDEMIA: ICD-10-CM

## 2024-09-09 DIAGNOSIS — B00.9 HERPES SIMPLEX: ICD-10-CM

## 2024-09-09 DIAGNOSIS — I10 HYPERTENSION, UNSPECIFIED TYPE: Primary | ICD-10-CM

## 2024-09-09 DIAGNOSIS — Z00.00 PHYSICAL EXAM: ICD-10-CM

## 2024-09-09 LAB
ALBUMIN SERPL BCG-MCNC: 4.7 G/DL (ref 3.5–5.2)
ALP SERPL-CCNC: 41 U/L (ref 40–150)
ALT SERPL W P-5'-P-CCNC: 22 U/L (ref 0–70)
ANION GAP SERPL CALCULATED.3IONS-SCNC: 12 MMOL/L (ref 7–15)
AST SERPL W P-5'-P-CCNC: 27 U/L (ref 0–45)
BILIRUB SERPL-MCNC: 0.6 MG/DL
BUN SERPL-MCNC: 13.2 MG/DL (ref 8–23)
CALCIUM SERPL-MCNC: 9.7 MG/DL (ref 8.8–10.4)
CHLORIDE SERPL-SCNC: 101 MMOL/L (ref 98–107)
CHOLEST SERPL-MCNC: 221 MG/DL
CREAT SERPL-MCNC: 0.98 MG/DL (ref 0.67–1.17)
EGFRCR SERPLBLD CKD-EPI 2021: 88 ML/MIN/1.73M2
FASTING STATUS PATIENT QL REPORTED: ABNORMAL
FASTING STATUS PATIENT QL REPORTED: ABNORMAL
GLUCOSE SERPL-MCNC: 103 MG/DL (ref 70–99)
HAV AB SER QL IA: REACTIVE
HBV SURFACE AB SERPL IA-ACNC: 116 M[IU]/ML
HBV SURFACE AB SERPL IA-ACNC: REACTIVE M[IU]/ML
HCO3 SERPL-SCNC: 27 MMOL/L (ref 22–29)
HDLC SERPL-MCNC: 71 MG/DL
LDLC SERPL CALC-MCNC: 130 MG/DL
NONHDLC SERPL-MCNC: 150 MG/DL
POTASSIUM SERPL-SCNC: 4.5 MMOL/L (ref 3.4–5.3)
PROT SERPL-MCNC: 7.5 G/DL (ref 6.4–8.3)
PSA SERPL DL<=0.01 NG/ML-MCNC: 1.34 NG/ML (ref 0–4.5)
SODIUM SERPL-SCNC: 140 MMOL/L (ref 135–145)
TRIGL SERPL-MCNC: 101 MG/DL
VIT D+METAB SERPL-MCNC: 54 NG/ML (ref 20–50)

## 2024-09-09 PROCEDURE — 99396 PREV VISIT EST AGE 40-64: CPT | Performed by: FAMILY MEDICINE

## 2024-09-09 PROCEDURE — 36415 COLL VENOUS BLD VENIPUNCTURE: CPT | Performed by: FAMILY MEDICINE

## 2024-09-09 PROCEDURE — 80053 COMPREHEN METABOLIC PANEL: CPT | Performed by: FAMILY MEDICINE

## 2024-09-09 PROCEDURE — 86706 HEP B SURFACE ANTIBODY: CPT | Performed by: FAMILY MEDICINE

## 2024-09-09 PROCEDURE — 86708 HEPATITIS A ANTIBODY: CPT | Performed by: FAMILY MEDICINE

## 2024-09-09 PROCEDURE — 80061 LIPID PANEL: CPT | Performed by: FAMILY MEDICINE

## 2024-09-09 PROCEDURE — G0103 PSA SCREENING: HCPCS | Performed by: FAMILY MEDICINE

## 2024-09-09 PROCEDURE — 82306 VITAMIN D 25 HYDROXY: CPT | Performed by: FAMILY MEDICINE

## 2024-09-09 PROCEDURE — 99214 OFFICE O/P EST MOD 30 MIN: CPT | Mod: 25 | Performed by: FAMILY MEDICINE

## 2024-09-09 RX ORDER — VALACYCLOVIR HYDROCHLORIDE 500 MG/1
TABLET, FILM COATED ORAL
Qty: 180 TABLET | Refills: 1 | Status: SHIPPED | OUTPATIENT
Start: 2024-09-09

## 2024-09-09 RX ORDER — SILDENAFIL CITRATE 20 MG/1
TABLET ORAL
Qty: 90 TABLET | Refills: 1 | Status: SHIPPED | OUTPATIENT
Start: 2024-09-09 | End: 2024-09-09

## 2024-09-09 RX ORDER — LISINOPRIL 20 MG/1
20 TABLET ORAL DAILY
Qty: 90 TABLET | Refills: 3 | Status: SHIPPED | OUTPATIENT
Start: 2024-09-09

## 2024-09-09 RX ORDER — SILDENAFIL CITRATE 20 MG/1
TABLET ORAL
Qty: 90 TABLET | Refills: 1 | Status: SHIPPED | OUTPATIENT
Start: 2024-09-09 | End: 2024-09-10

## 2024-09-09 RX ORDER — PANTOPRAZOLE SODIUM 40 MG/1
TABLET, DELAYED RELEASE ORAL
Qty: 45 TABLET | Refills: 3 | Status: SHIPPED | OUTPATIENT
Start: 2024-09-09

## 2024-09-09 NOTE — LETTER
September 10, 2024      Jhoan Renteria  [address] [facility] [address]        Dear ,    We are writing to inform you of your test results.  Sugar is just slightly high at 103, ideally under 100, but this is not in the range of diabetes, good diet and exercise will help keep low.  Liver and kidney tests are otherwise normal  Just a slight elevation of cholesterol.  Vitamin D level is just slightly elevated I would recommend decreasing the dose of vitamin D from 5000 international units to 2000 international units daily.  Testing shows that you are immune to hepatitis A and hepatitis B in other words you do not need any further vaccines.  The PSA or prostate test is normal    I will contact you separately about the coronary calcium score.  See us again in 1 year          Resulted Orders   Comprehensive metabolic panel   Result Value Ref Range    Sodium 140 135 - 145 mmol/L    Potassium 4.5 3.4 - 5.3 mmol/L    Carbon Dioxide (CO2) 27 22 - 29 mmol/L    Anion Gap 12 7 - 15 mmol/L    Urea Nitrogen 13.2 8.0 - 23.0 mg/dL    Creatinine 0.98 0.67 - 1.17 mg/dL    GFR Estimate 88 >60 mL/min/1.73m2      Comment:      eGFR calculated using 2021 CKD-EPI equation.    Calcium 9.7 8.8 - 10.4 mg/dL      Comment:      Reference intervals for this test were updated on 7/16/2024 to reflect our healthy population more accurately. There may be differences in the flagging of prior results with similar values performed with this method. Those prior results can be interpreted in the context of the updated reference intervals.    Chloride 101 98 - 107 mmol/L    Glucose 103 (H) 70 - 99 mg/dL    Alkaline Phosphatase 41 40 - 150 U/L    AST 27 0 - 45 U/L    ALT 22 0 - 70 U/L    Protein Total 7.5 6.4 - 8.3 g/dL    Albumin 4.7 3.5 - 5.2 g/dL    Bilirubin Total 0.6 <=1.2 mg/dL    Patient Fasting > 8hrs? Unknown    Lipid panel reflex to direct LDL Fasting   Result Value Ref Range    Cholesterol 221 (H) <200 mg/dL    Triglycerides  101 <150 mg/dL    Direct Measure HDL 71 >=40 mg/dL    LDL Cholesterol Calculated 130 (H) <100 mg/dL    Non HDL Cholesterol 150 (H) <130 mg/dL    Patient Fasting > 8hrs? Unknown     Narrative    Cholesterol  Desirable: < 200 mg/dL  Borderline High: 200 - 239 mg/dL  High: >= 240 mg/dL    Triglycerides  Normal: < 150 mg/dL  Borderline High: 150 - 199 mg/dL  High: 200-499 mg/dL  Very High: >= 500 mg/dL    Direct Measure HDL  Female: >= 50 mg/dL   Male: >= 40 mg/dL    LDL Cholesterol  Desirable: < 100 mg/dL  Above Desirable: 100 - 129 mg/dL   Borderline High: 130 - 159 mg/dL   High:  160 - 189 mg/dL   Very High: >= 190 mg/dL    Non HDL Cholesterol  Desirable: < 130 mg/dL  Above Desirable: 130 - 159 mg/dL  Borderline High: 160 - 189 mg/dL  High: 190 - 219 mg/dL  Very High: >= 220 mg/dL   PROSTATE SPEC ANTIGEN SCREEN   Result Value Ref Range    Prostate Specific Antigen Screen 1.34 0.00 - 4.50 ng/mL    Narrative    This result is obtained using the Roche Elecsys total PSA method on the blanca e801 immunoassay analyzer. Results obtained with different assay methods or kits cannot be used interchangeably.   Vitamin D Deficiency   Result Value Ref Range    Vitamin D, Total (25-Hydroxy) 54 (H) 20 - 50 ng/mL      Comment:      indicates supplementation, with increased risk of hypercalciuria    Narrative    Season, race, dietary intake, and treatment affect the concentration of 25-hydroxy-Vitamin D. Values may decrease during winter months and increase during summer months.    Vitamin D determination is routinely performed by an immunoassay specific for 25 hydroxyvitamin D3.  If an individual is on vitamin D2(ergocalciferol) supplementation, please specify 25 OH vitamin D2 and D3 level determination by LCMSMS test VITD23.     Hepatitis A Antibody Total   Result Value Ref Range    Hepatitis A Antibody Total Reactive       Comment:      This assay detects the presence of anti-hepatitis A virus (anti-HAV) total (both IgG and IgM  combined). If clinically indicated, specific testing for anti-HAV IgM (HAVM / Hepatitis A IgM Antibody, Serum) is necessary to confirm the presence of acute or recent hepatitis A. Please see interpretation guide below.    Narrative    HAV antibody testing interpretation chart:      HAV Total Antibody - NONREACTIVE  HAV IgM - NOT TESTED  Comments: No evidence of vaccination or previous infection; Susceptible to Hepatitis A infection     HAV Total Antibody - REACTIVE   HAV IgM - NOT TESTED  Comments:Consistent with recent or remote Hepatitis A infection or antibody response to HAV vaccination    HAV Total Antibody - REACTIVE  HAV IgM - NONREACTIVE  Comments:Consistent with resolved Hepatitis A infection or antibody response to HAV vaccination    HAV Total Antibody - REACTIVE  HAV IgM - REACTIVE  Comments:Consistent with active Hepatitis A infection   Hepatitis B Surface Antibody   Result Value Ref Range    Hepatitis B Surface Antibody Reactive       Comment:      A reactive result indicates recovery from acute or chronic hepatitis B virus (HBV) infection or acquired immunity from HBV vaccination. This assay does not differentiate between a vaccine-induced immune response and an immune response induced by infection with HBV. A positive total antihepatitis B core result would indicate that the hepatitis B surface antibody response is due to past HBV infection.    Hepatitis B Surface Antibody Instrument Value 116.00 <8.5 m[IU]/mL       If you have any questions or concerns, please call the clinic at the number listed above.       Sincerely,      Davy Houston MD

## 2024-09-09 NOTE — PROGRESS NOTES
Preventive Care Visit  [facility] WANDY Houston MD, Family Medicine  Sep 9, 2024      Assessment & Plan     (I10) Hypertension   (primary encounter diagnosis)  Comment: Well-controlled on lisinopril  Plan: lisinopril (ZESTRIL) 20 MG tablet, CT Coronary         Calcium Scan             (Z00.00) Physical exam  Comment: Overall the patient has extremely good health habits and is in good health   Plan: PRIMARY CARE FOLLOW-UP SCHEDULING             (B00.9) Herpes simplex  Comment: Valtrex as needed  Plan: valACYclovir (VALTREX) 500 MG tablet             (E78.49) Hyperlipidemia  Comment: Elevated attempting to control with diet and exercise  Plan: Comprehensive metabolic panel, Lipid panel         reflex to direct LDL Fasting, CT Coronary         Calcium Scan             (K22.2) Esophageal stricture  Comment: Protonix every other day control  Plan: pantoprazole (PROTONIX) 40 MG EC tablet             (N52.9) Male erectile disorder  Comment: Viagra as needed  Plan: sildenafil (REVATIO) 20 MG tablet             (Z12.5) Screening for prostate cancer  Comment: No known family history of  Plan: PROSTATE SPEC ANTIGEN SCREEN             (Z13.21) Encounter for vitamin deficiency screening  Comment: Patient takes 5000 international units daily  Plan: Vitamin D Deficiency             (Z71.84) Counseling about travel  Comment: Patient will in the future to do international travel status of hepatitis A and B is not known  Plan: Hepatitis A Antibody Total, Hepatitis B Surface        Antibody             PLAN:  1.  Laboratory studies as above including titers  2.  Patient may need vaccination against hepatitis A and B depending on results as above  3.  Medications renewed as above, I did explain to the patient that he can take up to 5 tablets of Viagra as needed  4.  CT coronary calcium score  5.  Patient otherwise should be seen yearly            BMI  Estimated body mass index is 25.99 kg/m  as calculated  "from the following:    Height as of this encounter: 1.854 m (6' 1\").    Weight as of this encounter: 89.4 kg (197 lb).   Weight management plan: Discussed healthy diet and exercise guidelines    Counseling  Appropriate preventive services were addressed with this patient via screening, questionnaire, or discussion as appropriate for fall prevention, nutrition, physical activity, Tobacco-use cessation, social engagement, weight loss and cognition.  Checklist reviewing preventive services available has been given to the patient.  Reviewed patient's diet, addressing concerns and/or questions.   The patient reports drinking more than 3 alcoholic drinks per day and/or more than 7 drhnks per week. The patient was counseled and given information about possible harmful effects of excessive alcohol intake.        Aranza Staples is a 59 year old, presenting for the following:  No chief complaint on file.        9/9/2024     6:52 AM   Additional Questions   Roomed by Ronda   Accompanied by self        Health Care Directive  Patient does not have a Health Care Directive or Living Will: Discussed advance care planning with patient; information given to patient to review.    HPI  Patient comes in for his annual physical examination.  Patient has a history of hypertension well-controlled on lisinopril.    Patient has a history of an esophageal stricture.  Of note he is on Protonix, we discussed there are certainly long-term concerns and considerations with this he is going to take Protonix every other day and find so that that is controlling symptoms.    Patient has a history of male rectal difficulties, of note he is not finding Viagra as helpful as it was in the past but I told him he can take up to 5 pills daily.    Patient has a history of cold sores he takes Valtrex as needed    Patient received notice that he is due for colorectal cancer screening    Patient is wife are going to be traveling overseas beginning in the " next several years or so he believes he was immunized against hepatitis B in the past but is not positive about that, I do not have any records of immunization and like to check his titers for hepatitis A and hepatitis B we also talked about closer to travel he may need malaria prophylaxis typhoid vaccine perhaps other interventions depending on where he travels.    We have been checking the patient's PSA for prostate cancer screening though it does not run in the family.    Patient has a history of hyperlipidemia attempting to control this with lifestyle.    Patient did have a coronary calcium score 5 years ago that was completely normal, after discussion we will repeat the screen.    Patient takes 5000 international units of vitamin D daily, his wife takes the same amount and her level was elevated when checked recently therefore we will going to check his vitamin D status as well.                        9/9/2024   General Health   How would you rate your overall physical health? (!) FAIR   Feel stress (tense, anxious, or unable to sleep) Not at all            9/9/2024   Nutrition   Three or more servings of calcium each day? (!) NO   Diet: Regular (no restrictions)   How many servings of fruit and vegetables per day? (!) 2-3   How many sweetened beverages each day? 0-1            9/9/2024   Exercise   Days per week of moderate/strenous exercise 6 days   Average minutes spent exercising at this level 60 min            9/9/2024   Social Factors   Frequency of gathering with friends or relatives Once a week   Worry food won't last until get money to buy more No   Food not last or not have enough money for food? No   Do you have housing? (Housing is defined as stable permanent housing and does not include staying ouside in a car, in a tent, in an abandoned building, in an overnight shelter, or couch-surfing.) Yes   Are you worried about losing your housing? No   Lack of transportation? No   Unable to get utilities  (heat,electricity)? No            9/9/2024   Fall Risk   Fallen 2 or more times in the past year? No   Trouble with walking or balance? No             9/9/2024   Dental   Dentist two times every year? Yes            9/9/2024   TB Screening   Were you born outside of the [place]? No            Today's PHQ-2 Score:       9/9/2024     6:43 AM   PHQ-2 ( 1999 [employer])   Q1: Little interest or pleasure in doing things 0   Q2: Feeling down, depressed or hopeless 0   PHQ-2 Score 0   Q1: Little interest or pleasure in doing things Not at all   Q2: Feeling down, depressed or hopeless Not at all   PHQ-2 Score 0           9/9/2024   Substance Use   Alcohol more than 3/day or more than 7/wk Yes   How often do you have a drink containing alcohol 4 or more times a week   How many alcohol drinks on typical day 1 or 2   How often do you have 5+ drinks at one occasion Never   Audit 2/3 Score 0   How often not able to stop drinking once started Never   How often failed to do what normally expected Never   How often needed first drink in am after a heavy drinking session Never   How often feeling of guilt or remorse after drinking Never   How often unable to remember what happened the night before Never   Have you or someone else been injured because of your drinking No   Has anyone been concerned or suggested you cut down on drinking No   TOTAL SCORE - AUDIT 4   Do you use any other substances recreationally? No        Social History     Tobacco Use    Smoking status: Never    Smokeless tobacco: Never   Vaping Use    Vaping status: Never Used   Substance Use Topics    Alcohol use: Yes     Alcohol/week: 7.0 standard drinks of alcohol     Types: 7 Cans of beer per week    Drug use: No           9/9/2024   STI Screening   New sexual partner(s) since last STI/HIV test? No      Last PSA:   Prostate Specific Antigen Screen   Date Value Ref Range Status   09/07/2023 1.30 0.00 - 3.50 ng/mL Final   02/06/2017 0.7 0.0 - 3.5 ng/mL Final     ASCVD Risk    The ASCVD Risk score (Luis RAY, et al., 2019) failed to calculate for the following reasons:    The systolic blood pressure is missing           Reviewed and updated as needed this visit by Provider                    History reviewed. No pertinent past medical history.  Past Surgical History:   Procedure Laterality Date    FOOT SURGERY Right     Bone spur removal.    SKIN LESION EXCISION      not cancer     Current Outpatient Medications   Medication Sig Dispense Refill    lisinopril (ZESTRIL) 20 MG tablet Take 1 tablet (20 mg) by mouth daily. 90 tablet 3    pantoprazole (PROTONIX) 40 MG EC tablet Take one tablet every other day 45 tablet 3    sildenafil (REVATIO) 20 MG tablet Take one to five pills daily as needed for sex 90 tablet 1    valACYclovir (VALTREX) 500 MG tablet TAKE ONE TABLET BY MOUTH TWICE DAILY AS NEEDED FOR OUTBREAKS 180 tablet 1     No Known Allergies      Review of Systems  Constitutional, HEENT, cardiovascular, pulmonary, GI, , musculoskeletal, neuro, skin, endocrine and psych systems are negative, except as otherwise noted.     Objective    Exam  There were no vitals taken for this visit.   Estimated body mass index is 25.77 kg/m  as calculated from the following:    Height as of 9/7/23: 1.829 m (6').    Weight as of 9/7/23: 86.2 kg (190 lb).    Physical Exam  GENERAL: alert and no distress  EYES: Eyes grossly normal to inspection, PERRL and conjunctivae and sclerae normal  HENT: ear canals and TM's normal, nose and mouth without ulcers or lesions  NECK: no adenopathy, no asymmetry, masses, or scars  RESP: lungs clear to auscultation - no rales, rhonchi or wheezes  CV: regular rate and rhythm, normal S1 S2, no S3 or S4, no murmur, click or rub, no peripheral edema  ABDOMEN: soft, nontender, no hepatosplenomegaly, no masses and bowel sounds normal  MS: no gross musculoskeletal defects noted, no edema  SKIN: no suspicious lesions or rashes  NEURO: Normal strength and tone, mentation  intact and speech normal  PSYCH: mentation appears normal, affect normal/bright        Signed Electronically by: Davy Houston MD

## 2024-09-10 ENCOUNTER — TELEPHONE (OUTPATIENT)
Dept: FAMILY MEDICINE | Facility: CLINIC | Age: 59
End: 2024-09-10
Payer: COMMERCIAL

## 2024-09-10 DIAGNOSIS — N52.9 MALE ERECTILE DISORDER: ICD-10-CM

## 2024-09-10 RX ORDER — SILDENAFIL CITRATE 20 MG/1
TABLET ORAL
Qty: 90 TABLET | Refills: 1 | Status: SHIPPED | OUTPATIENT
Start: 2024-09-10

## 2024-09-10 NOTE — TELEPHONE ENCOUNTER
Prior Authorization Retail Medication Request    Medication/Dose:   sildenafil (REVATIO) 20 MG tablet 90 tablet 1 9/9/2024 -- No   Sig: TAKE ONE (1) TO FIVE (5) TABLETS BY MOUTH AS NEEDED FOR INTERCOURSE   Sent to pharmacy as: Sildenafil Citrate 20 MG Oral Tablet (REVATIO)   Class: E-Prescribe   Order: [id number]   E-Prescribing Status: Receipt confirmed by pharmacy (9/9/2024  3:55 PM CDT)   Prior authorization: Closed - Prior Authorization duplicate/in process     Printout Tracking    External Result Report     Medication Administration Instructions    TAKE ONE (1) TO FIVE (5) TABLETS BY MOUTH AS NEEDED FOR INTERCOURSE     Pharmacy    [id number] - [place], [place] - [address]     Associated Diagnoses    Male erectile disorder [N52.9]

## 2024-10-07 ENCOUNTER — HOSPITAL ENCOUNTER (OUTPATIENT)
Dept: CT IMAGING | Facility: CLINIC | Age: 59
Discharge: HOME | End: 2024-10-07
Attending: FAMILY MEDICINE | Admitting: FAMILY MEDICINE
Payer: COMMERCIAL

## 2024-10-07 DIAGNOSIS — I10 HYPERTENSION, UNSPECIFIED TYPE: ICD-10-CM

## 2024-10-07 DIAGNOSIS — E78.49 OTHER HYPERLIPIDEMIA: ICD-10-CM

## 2024-10-07 PROCEDURE — 75571 CT HRT W/O DYE W/CA TEST: CPT | Mod: 26 | Performed by: INTERNAL MEDICINE

## 2024-10-07 PROCEDURE — 75571 CT HRT W/O DYE W/CA TEST: CPT

## 2024-10-08 ENCOUNTER — MYC MEDICAL ADVICE (OUTPATIENT)
Dept: FAMILY MEDICINE | Facility: CLINIC | Age: 59
End: 2024-10-08
Payer: COMMERCIAL

## 2024-10-10 ENCOUNTER — OFFICE VISIT (OUTPATIENT)
Dept: PODIATRY | Facility: CLINIC | Age: 59
End: 2024-10-10
Payer: COMMERCIAL

## 2024-10-10 VITALS — BODY MASS INDEX: 25.99 KG/M2 | WEIGHT: 197 LBS | HEART RATE: 73 BPM | OXYGEN SATURATION: 98 %

## 2024-10-10 DIAGNOSIS — L60.0 INGROWN TOENAIL: Primary | ICD-10-CM

## 2024-10-10 PROCEDURE — 11750 EXCISION NAIL&NAIL MATRIX: CPT | Mod: 51 | Performed by: PODIATRIST

## 2024-10-10 RX ORDER — LIDOCAINE HYDROCHLORIDE 20 MG/ML
5 INJECTION, SOLUTION INFILTRATION; PERINEURAL ONCE
Status: COMPLETED | OUTPATIENT
Start: 2024-10-10 | End: 2024-10-10

## 2024-10-10 RX ADMIN — LIDOCAINE HYDROCHLORIDE 5 ML: 20 INJECTION, SOLUTION INFILTRATION; PERINEURAL at 08:48

## 2024-10-10 ASSESSMENT — PAIN SCALES - GENERAL: PAINLEVEL: NO PAIN (0)

## 2024-10-10 NOTE — PATIENT INSTRUCTIONS
POSTOPERATIVE INSTRUCTIONS AFTER CHEMICAL NAIL REMOVAL SURGERY    What to expect after surgery:  Your toe will be numb for around 2-8 hours after your nail procedure due to the shots that were given.  Expect some degree of soreness in your toe later today when the numbness wears off.  Rest, elevation and ice applied at the ankle will help ease the pain. Your bandage was wrapped snug to cut down on bleeding.    This may feel tight when the numbness wears off.  Please remove the bandage tomorrow morning and begin the foot soaks described below.  Warm water will feel good and helps to ease the pain  How to Care for Your Toe After Surgery  One daily foot soak will be necessary to heal properly.  Chemicals were used to kill the root of the nail.  Expect local redness, drainage and tenderness , this will last for 6-8 weeks.  Soaking helps loosen the scab and dried drainage.  Failure to soak leads to a hard scab that blocks drainage.  Back up drainage increases the pain and the scab may need to be removed with another office procedure.  You will heal much quicker and be more comfortable if you are consistent with local wound care and foot soaks.  Soak one time every day for 2 weeks.  Soaks should last 10 minutes.  Soak after taking a shower to get the germs out.  Soak in warm water with hydrogen peroxide 5 parts water to 1 part hydrogen peroxide.  A small amount of bleeding may be noted which is normal.   Clean the surgical site with a Q-tip during each soak.  Dip the Q-tip in the water and gently clean away any crusted drainage.  The area may be tender but you will not harm anything with the Q-tip.  Pat the area dry and allow a few minutes to air dry before applying any bandages.  Flexible fabric style band aids work best.  In general, the area will be wet from drainage.  A small dab of antibiotic ointment is okay but watch out for excessive moisture.  White and wrinkled skin is a sign of too much moisture and that the  skin needs to be dried out. It is ok to allow the toe some air by removing the band aid as needed.  Activity  Feel free to do normal activities as tolerated on the following day.  Wear open toe sandals if regular shoes are not comfortable.  Avoid shoes that are tight on the toe. NO SWIMMING IN POOLS OR LAKES UNTIL DRAINAGE CLEARS UP ON THE TOENAIL(S).  Medications   Finish any antibiotics if they have been prescribed.  Tylenol or ibuprofen may be used as needed for pain.  Icing and elevation also help with pain and swelling.  Risks  Watch for signs of infection.  It is normal to see redness, local tenderness, and drainage around the nail area for up to 8 weeks after permanent nail removal surgery.  Call the clinic at [phone] if you see red streaks spreading up the toe, foot, or leg.  Fever and chills are also concerning and you should notify the clinic if you are having these symptoms.  If these symptoms occur when the clinic is closed, please go to urgent care.  How Well Does Permanent Nail Surgery Work?  Permanent nail surgery means that we intend that the nail problem will not come back.  In a small percentage of patients, nail problems return in about 6 months to a year.  We would like to see you back in the office if you are experiencing problems in the future.  Please call [phone] with any additional questions.

## 2024-10-10 NOTE — Clinical Note
10/10/2024      Jhoan Renteria  [address] [facility] [address]      Dear Colleague,    Thank you for referring your patient, Jhoan Renteria, to the [facility]. Please see a copy of my visit note below.    FOOT AND ANKLE SURGERY/PODIATRY Progress Note        ASSESSMENT:   Ingrown toenail both great toes    HPI: Jhoan Renteria was seen again today complaining of painful ingrown toenails on the medial border of both great toes.  The patient indicated that he has had a problem with his toenails for quite some time.  He does have some redness and pain along the medial margins of the toenails.  The pain is aggravated by shoe gear and ambulation.  He denies any trauma to the toes.  He has not had any drainage or bleeding.  The pain is moderate.  There are no factors which completely relieve his pain.  He denies any other previous treatment.    History reviewed. No pertinent past medical history.     Past Surgical History:   Procedure Laterality Date    FOOT SURGERY Right     Bone spur removal.    SKIN LESION EXCISION      not cancer       No Known Allergies      Current Outpatient Medications:     lisinopril (ZESTRIL) 20 MG tablet, Take 1 tablet (20 mg) by mouth daily., Disp: 90 tablet, Rfl: 3    pantoprazole (PROTONIX) 40 MG EC tablet, Take one tablet every other day, Disp: 45 tablet, Rfl: 3    sildenafil (REVATIO) 20 MG tablet, TAKE ONE (1) TO FIVE (5) TABLETS BY MOUTH AS NEEDED FOR INTERCOURSE, Disp: 90 tablet, Rfl: 1    valACYclovir (VALTREX) 500 MG tablet, TAKE ONE TABLET BY MOUTH TWICE DAILY AS NEEDED FOR OUTBREAKS, Disp: 180 tablet, Rfl: 1    Family History   Problem Relation Age of Onset    Hypertension Mother     Ovarian Cancer Mother     Atrial fibrillation Mother         Ablation    Hyperlipidemia Brother     Hypertension Brother     Arthritis Brother     Hypertension Brother     Hyperlipidemia Brother     Hypertension Brother     Hyperlipidemia Brother     Cancer  Maternal Grandmother     Heart Disease Maternal Grandfather     Dementia Paternal Grandmother     Cancer Paternal Grandmother     Parkinsonism Paternal Grandmother     Cerebrovascular Disease Paternal Grandfather 52    Coronary Artery Disease Paternal Grandfather 58    Heart Disease Paternal Uncle     Cerebrovascular Disease Paternal Uncle     Prostate Cancer Paternal Uncle        Social History     Socioeconomic History    Marital status:      Spouse name: Not on file    Number of children: 4    Years of education: Not on file    Highest education level: Not on file   Occupational History    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 Not Asked   Social History Narrative    Diet- Regular, eat better,         Exercise- Walking, weightlifting        Wife, breast [place]        [employer]     Social Determinants of Health     Financial Resource Strain: Low Risk  (9/9/2024)    Financial Resource Strain     Within the past 12 months, have you or your family members you live with been unable to get utilities (heat, electricity) when it was really needed?: No   Food Insecurity: Low Risk  (9/9/2024)    Food Insecurity     Within the past 12 months, did you worry that your food would run out before you got money to buy more?: No     Within the past 12 months, did the food you bought just not last and you didn t have money to get more?: No   Transportation Needs: Low Risk  (9/9/2024)    Transportation Needs     Within the past 12 months, has lack of transportation kept you from medical  appointments, getting your medicines, non-medical meetings or appointments, work, or from getting things that you need?: No   Physical Activity: Sufficiently Active (9/9/2024)    Exercise Vital Sign     Days of Exercise per Week: 6 days     Minutes of Exercise per Session: 60 min   Stress: No Stress Concern Present (9/9/2024)    [employer] [place] [employer] - Occupational Stress Questionnaire     Feeling of Stress : Not at all   Social Connections: Unknown (9/9/2024)    Social Connection and Isolation Panel [NHANES]     Frequency of Communication with Friends and Family: Not on file     Frequency of Social Gatherings with Friends and Family: Once a week     Attends Protestant Services: Not on file     Active Member of Clubs or Organizations: Not on file     Attends Club or Organization Meetings: Not on file     Marital Status: Not on file   Interpersonal Safety: Low Risk  (9/9/2024)    Interpersonal Safety     Do you feel physically and emotionally safe where you currently live?: Yes     Within the past 12 months, have you been hit, slapped, kicked or otherwise physically hurt by someone?: No     Within the past 12 months, have you been humiliated or emotionally abused in other ways by your partner or ex-partner?: No   Housing Stability: Low Risk  (9/9/2024)    Housing Stability     Do you have housing? : Yes     Are you worried about losing your housing?: No       10 point Review of Systems is negative      Pulse 73   Wt 89.4 kg (197 lb)   SpO2 98%   BMI 25.99 kg/m      BMI= Body mass index is 25.99 kg/m .    OBJECTIVE:  General appearance: Patient is alert and fully cooperative with history & exam.  No sign of distress is noted during the visit.  Vascular: Dorsalis pedis and posterior tibial pulses are palpable. There is pedal hair growth bilaterally.  CFT < 3 sec from anterior tibial surface to distal digits bilaterally. There is no appreciable edema noted.  Dermatologic: The medial borders of both  great toenails are slightly aggravated.  Turgor and texture are within normal limits. No coloration or temperature changes. No primary or secondary lesions noted.  Neurologic: All epicritic and proprioceptive sensations are grossly intact bilaterally.  Musculoskeletal: All active and passive ankle, subtalar, midtarsal, and 1st MPJ range of motion are grossly intact bilaterally.  Manual muscle strength is within normal limits bilaterally. All dorsiflexors, plantarflexors, invertors, evertors are intact bilaterally. Tenderness present to the medial margins of both great toenails on palpation.  No tenderness to both great toes with range of motion. Calf is soft/non-tender without warmth/induration    Imaging:         CT Coronary Calcium Scan    Result Date: 10/8/2024    Calcium Scoring: The total Agatston score is 0. A calcium score of zero places the individual in the lowest quartile when compared to an age and gender matched control group and implies a low risk of cardiac events in the next 10 years. (less than 1% per year).     Radiologist Consult For Cardiology    Result Date: 10/7/2024  OVERREAD: DETAILED [place] [facility] EXTRACARDIAC OVERREAD OF CARDIAC CT LOCATION: [facility] DATE: 10/7/2024 INDICATION:  Hypertension, unspecified type, Other hyperlipidemia TECHNIQUE: Dose reduction techniques were used. COMPARISON: None. FINDINGS:  LIMITED CHEST: Negative. LIMITED MEDIASTINUM: Negative. LIMITED UPPER ABDOMEN: Negative.     IMPRESSION:  1.  No significant incidental extracardiac findings. 2.  Please refer to cardiologist's dictation for the cardiac CT report.       Treatment: Performed partial nail excision and matrixectomy of the medial borders of both great toenails today under local anesthesia of 2% lidocaine plain via the phenol and alcohol technique.  The patient tolerated the procedures and anesthesia well and was discharged in good condition.  He was given both written and verbal  postoperative instructions.  He is to return to the clinic as needed.        Chaitanya Zarate; BRITANY  [facility] Foot & Ankle Surgery/Podiatry         Again, thank you for allowing me to participate in the care of your patient.        Sincerely,        Chaitanya Zarate DPM

## 2024-10-10 NOTE — PROGRESS NOTES
FOOT AND ANKLE SURGERY/PODIATRY Progress Note        ASSESSMENT:   Ingrown toenail both great toes    HPI: Jhona Renteria was seen again today complaining of painful ingrown toenails on the medial border of both great toes.  The patient indicated that he has had a problem with his toenails for quite some time.  He does have some redness and pain along the medial margins of the toenails.  The pain is aggravated by shoe gear and ambulation.  He denies any trauma to the toes.  He has not had any drainage or bleeding.  The pain is moderate.  There are no factors which completely relieve his pain.  He denies any other previous treatment.    History reviewed. No pertinent past medical history.     Past Surgical History:   Procedure Laterality Date    FOOT SURGERY Right     Bone spur removal.    SKIN LESION EXCISION      not cancer       No Known Allergies      Current Outpatient Medications:     lisinopril (ZESTRIL) 20 MG tablet, Take 1 tablet (20 mg) by mouth daily., Disp: 90 tablet, Rfl: 3    pantoprazole (PROTONIX) 40 MG EC tablet, Take one tablet every other day, Disp: 45 tablet, Rfl: 3    sildenafil (REVATIO) 20 MG tablet, TAKE ONE (1) TO FIVE (5) TABLETS BY MOUTH AS NEEDED FOR INTERCOURSE, Disp: 90 tablet, Rfl: 1    valACYclovir (VALTREX) 500 MG tablet, TAKE ONE TABLET BY MOUTH TWICE DAILY AS NEEDED FOR OUTBREAKS, Disp: 180 tablet, Rfl: 1    Family History   Problem Relation Age of Onset    Hypertension Mother     Ovarian Cancer Mother     Atrial fibrillation Mother         Ablation    Hyperlipidemia Brother     Hypertension Brother     Arthritis Brother     Hypertension Brother     Hyperlipidemia Brother     Hypertension Brother     Hyperlipidemia Brother     Cancer Maternal Grandmother     Heart Disease Maternal Grandfather     Dementia Paternal Grandmother     Cancer Paternal Grandmother     Parkinsonism Paternal Grandmother     Cerebrovascular Disease Paternal Grandfather 52    Coronary Artery Disease  Paternal Grandfather 58    Heart Disease Paternal Uncle     Cerebrovascular Disease Paternal Uncle     Prostate Cancer Paternal Uncle        Social History     Socioeconomic History    Marital status:      Spouse name: Not on file    Number of children: 4    Years of education: Not on file    Highest education level: Not on file   Occupational History    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 Not Asked   Social History Narrative    Diet- Regular, eat better,         Exercise- Walking, weightlifting        Wife, breast [place]        [employer]     Social Determinants of Health     Financial Resource Strain: Low Risk  (9/9/2024)    Financial Resource Strain     Within the past 12 months, have you or your family members you live with been unable to get utilities (heat, electricity) when it was really needed?: No   Food Insecurity: Low Risk  (9/9/2024)    Food Insecurity     Within the past 12 months, did you worry that your food would run out before you got money to buy more?: No     Within the past 12 months, did the food you bought just not last and you didn t have money to get more?: No   Transportation Needs: Low Risk  (9/9/2024)    Transportation Needs     Within the past 12 months, has lack of transportation kept you from medical appointments, getting your medicines, non-medical meetings or appointments, work, or from getting things that you need?: No   Physical Activity: Sufficiently Active (9/9/2024)    Exercise Vital Sign     Days of Exercise per Week: 6 days     Minutes of  Exercise per Session: 60 min   Stress: No Stress Concern Present (9/9/2024)    [employer] [place] [employer] - Occupational Stress Questionnaire     Feeling of Stress : Not at all   Social Connections: Unknown (9/9/2024)    Social Connection and Isolation Panel [NHANES]     Frequency of Communication with Friends and Family: Not on file     Frequency of Social Gatherings with Friends and Family: Once a week     Attends Jainism Services: Not on file     Active Member of Clubs or Organizations: Not on file     Attends Club or Organization Meetings: Not on file     Marital Status: Not on file   Interpersonal Safety: Low Risk  (9/9/2024)    Interpersonal Safety     Do you feel physically and emotionally safe where you currently live?: Yes     Within the past 12 months, have you been hit, slapped, kicked or otherwise physically hurt by someone?: No     Within the past 12 months, have you been humiliated or emotionally abused in other ways by your partner or ex-partner?: No   Housing Stability: Low Risk  (9/9/2024)    Housing Stability     Do you have housing? : Yes     Are you worried about losing your housing?: No       10 point Review of Systems is negative      Pulse 73   Wt 89.4 kg (197 lb)   SpO2 98%   BMI 25.99 kg/m      BMI= Body mass index is 25.99 kg/m .    OBJECTIVE:  General appearance: Patient is alert and fully cooperative with history & exam.  No sign of distress is noted during the visit.  Vascular: Dorsalis pedis and posterior tibial pulses are palpable. There is pedal hair growth bilaterally.  CFT < 3 sec from anterior tibial surface to distal digits bilaterally. There is no appreciable edema noted.  Dermatologic: The medial borders of both great toenails are slightly aggravated.  Turgor and texture are within normal limits. No coloration or temperature changes. No primary or secondary lesions noted.  Neurologic: All epicritic and proprioceptive sensations are grossly intact  bilaterally.  Musculoskeletal: All active and passive ankle, subtalar, midtarsal, and 1st MPJ range of motion are grossly intact bilaterally.  Manual muscle strength is within normal limits bilaterally. All dorsiflexors, plantarflexors, invertors, evertors are intact bilaterally. Tenderness present to the medial margins of both great toenails on palpation.  No tenderness to both great toes with range of motion. Calf is soft/non-tender without warmth/induration    Imaging:         CT Coronary Calcium Scan    Result Date: 10/8/2024    Calcium Scoring: The total Agatston score is 0. A calcium score of zero places the individual in the lowest quartile when compared to an age and gender matched control group and implies a low risk of cardiac events in the next 10 years. (less than 1% per year).     Radiologist Consult For Cardiology    Result Date: 10/7/2024  OVERREAD: DETAILED [place] [facility] EXTRACARDIAC OVERREAD OF CARDIAC CT LOCATION: [facility] DATE: 10/7/2024 INDICATION:  Hypertension, unspecified type, Other hyperlipidemia TECHNIQUE: Dose reduction techniques were used. COMPARISON: None. FINDINGS:  LIMITED CHEST: Negative. LIMITED MEDIASTINUM: Negative. LIMITED UPPER ABDOMEN: Negative.     IMPRESSION:  1.  No significant incidental extracardiac findings. 2.  Please refer to cardiologist's dictation for the cardiac CT report.       Treatment: Performed partial nail excision and matrixectomy of the medial borders of both great toenails today under local anesthesia of 2% lidocaine plain via the phenol and alcohol technique.  The patient tolerated the procedures and anesthesia well and was discharged in good condition.  He was given both written and verbal postoperative instructions.  He is to return to the clinic as needed.        Chaitanya Zarate; BRITANY  [facility] Foot & Ankle Surgery/Podiatry

## 2024-10-18 ENCOUNTER — IMMUNIZATION (OUTPATIENT)
Dept: FAMILY MEDICINE | Facility: CLINIC | Age: 59
End: 2024-10-18
Payer: COMMERCIAL

## 2024-10-18 DIAGNOSIS — Z23 ENCOUNTER FOR IMMUNIZATION: Primary | ICD-10-CM

## 2024-10-18 PROCEDURE — 99207 PR NO CHARGE NURSE ONLY: CPT

## 2024-10-18 PROCEDURE — 90480 ADMN SARSCOV2 VAC 1/ONLY CMP: CPT

## 2024-10-18 PROCEDURE — 91320 SARSCV2 VAC 30MCG TRS-SUC IM: CPT

## 2024-10-19 ENCOUNTER — PATIENT OUTREACH (OUTPATIENT)
Dept: CARE COORDINATION | Facility: CLINIC | Age: 59
End: 2024-10-19
Payer: COMMERCIAL

## 2024-10-31 ENCOUNTER — IMMUNIZATION (OUTPATIENT)
Dept: FAMILY MEDICINE | Facility: CLINIC | Age: 59
End: 2024-10-31
Payer: COMMERCIAL

## 2024-10-31 PROCEDURE — 90471 IMMUNIZATION ADMIN: CPT

## 2024-10-31 PROCEDURE — 90673 RIV3 VACCINE NO PRESERV IM: CPT

## 2024-11-30 DIAGNOSIS — K22.2 ESOPHAGEAL STRICTURE: ICD-10-CM

## 2024-12-02 RX ORDER — PANTOPRAZOLE SODIUM 40 MG/1
TABLET, DELAYED RELEASE ORAL
Qty: 90 TABLET | Refills: 1 | OUTPATIENT
Start: 2024-12-02

## 2024-12-31 ENCOUNTER — TRANSFERRED RECORDS (OUTPATIENT)
Dept: HEALTH INFORMATION MANAGEMENT | Facility: CLINIC | Age: 59
End: 2024-12-31
Payer: COMMERCIAL

## 2025-01-02 ENCOUNTER — E-VISIT (OUTPATIENT)
Dept: FAMILY MEDICINE | Facility: CLINIC | Age: 60
End: 2025-01-02
Payer: COMMERCIAL

## 2025-01-02 DIAGNOSIS — M25.552 HIP PAIN, LEFT: Primary | ICD-10-CM

## 2025-01-02 NOTE — TELEPHONE ENCOUNTER
Provider E-Visit time total (minutes): 7 minutes  I note back in 2022 the patient had at that time quite painful left hip pain, he is now seeking physical therapy through [facility] I will place that referral.

## 2025-03-12 DIAGNOSIS — B00.9 HERPES SIMPLEX: ICD-10-CM

## 2025-03-12 RX ORDER — VALACYCLOVIR HYDROCHLORIDE 500 MG/1
TABLET, FILM COATED ORAL
Qty: 180 TABLET | Refills: 1 | Status: SHIPPED | OUTPATIENT
Start: 2025-03-12

## 2025-03-12 NOTE — TELEPHONE ENCOUNTER
Please advise.    Notes state medication as needed.     Script states medication daily, please advise if refill appropriate    (B00.9) Herpes simplex  Comment: Valtrex as needed  Plan: valACYclovir (VALTREX) 500 MG tablet      YULI Dunham  [facility]  [phone]    [facility]   Monday  - Thursday 7 AM - 6 PM    Friday  7 AM - 5 PM     -Please call your clinic for assistance from a nurse after hours.

## 2025-08-13 ENCOUNTER — TRANSFERRED RECORDS (OUTPATIENT)
Dept: ADMINISTRATIVE | Facility: CLINIC | Age: 60
End: 2025-08-13
Payer: COMMERCIAL